# Patient Record
Sex: FEMALE | Race: WHITE | NOT HISPANIC OR LATINO | Employment: FULL TIME | URBAN - METROPOLITAN AREA
[De-identification: names, ages, dates, MRNs, and addresses within clinical notes are randomized per-mention and may not be internally consistent; named-entity substitution may affect disease eponyms.]

---

## 2017-05-25 ENCOUNTER — OFFICE VISIT (OUTPATIENT)
Dept: URGENT CARE | Facility: CLINIC | Age: 22
End: 2017-05-25
Payer: COMMERCIAL

## 2017-05-25 VITALS
WEIGHT: 159.19 LBS | TEMPERATURE: 98 F | BODY MASS INDEX: 24.13 KG/M2 | HEIGHT: 68 IN | HEART RATE: 104 BPM | SYSTOLIC BLOOD PRESSURE: 118 MMHG | OXYGEN SATURATION: 98 % | DIASTOLIC BLOOD PRESSURE: 70 MMHG

## 2017-05-25 DIAGNOSIS — H60.91 OTITIS EXTERNA OF RIGHT EAR, UNSPECIFIED CHRONICITY, UNSPECIFIED TYPE: ICD-10-CM

## 2017-05-25 DIAGNOSIS — H66.91 RIGHT OTITIS MEDIA, UNSPECIFIED CHRONICITY, UNSPECIFIED OTITIS MEDIA TYPE: Primary | ICD-10-CM

## 2017-05-25 PROCEDURE — 99213 OFFICE O/P EST LOW 20 MIN: CPT | Mod: S$GLB,,, | Performed by: NURSE PRACTITIONER

## 2017-05-25 PROCEDURE — 99999 PR PBB SHADOW E&M-EST. PATIENT-LVL III: CPT | Mod: PBBFAC,,,

## 2017-05-25 RX ORDER — NEOMYCIN SULFATE, POLYMYXIN B SULFATE AND HYDROCORTISONE 10; 3.5; 1 MG/ML; MG/ML; [USP'U]/ML
3 SUSPENSION/ DROPS AURICULAR (OTIC) 3 TIMES DAILY
Qty: 10 ML | Refills: 0 | Status: SHIPPED | OUTPATIENT
Start: 2017-05-25 | End: 2017-10-16

## 2017-05-25 RX ORDER — AMOXICILLIN 500 MG/1
500 CAPSULE ORAL 3 TIMES DAILY
Qty: 30 CAPSULE | Refills: 0 | Status: SHIPPED | OUTPATIENT
Start: 2017-05-25 | End: 2017-06-04

## 2017-05-26 NOTE — PROGRESS NOTES
Subjective:       Patient ID: Weston Acuña is a 21 y.o. female.    Chief Complaint: Otalgia ((R))    Srini is a 21 year old female to clinic today with complaints of right otalgia that began today.       Otalgia    There is pain in the right ear. This is a new problem. The current episode started today. The problem occurs constantly. The problem has been gradually worsening. There has been no fever. The pain is at a severity of 6/10. The pain is moderate. Pertinent negatives include no abdominal pain, coughing, diarrhea, ear discharge, headaches, hearing loss, neck pain, rash, rhinorrhea, sore throat or vomiting. She has tried nothing for the symptoms.     Review of Systems   Constitutional: Negative for chills, diaphoresis, fatigue and fever.   HENT: Positive for ear pain (right). Negative for congestion, ear discharge, hearing loss, postnasal drip, rhinorrhea, sinus pressure, sneezing, sore throat and trouble swallowing.    Eyes: Negative for pain.   Respiratory: Negative for cough, chest tightness, shortness of breath and wheezing.    Cardiovascular: Negative for chest pain and palpitations.   Gastrointestinal: Negative for abdominal pain, diarrhea, nausea and vomiting.   Genitourinary: Negative for dysuria.   Musculoskeletal: Negative for back pain, myalgias and neck pain.   Skin: Negative for rash.   Neurological: Negative for dizziness, light-headedness and headaches.       Objective:      Physical Exam   Constitutional: She is oriented to person, place, and time. She appears well-developed and well-nourished. No distress.   HENT:   Head: Normocephalic.   Right Ear: External ear and ear canal normal. There is tenderness. Tympanic membrane is injected and bulging. A middle ear effusion is present.   Left Ear: Tympanic membrane, external ear and ear canal normal. No tenderness. Tympanic membrane is not bulging.   Nose: Nose normal. No mucosal edema or rhinorrhea. Right sinus exhibits no maxillary sinus  tenderness and no frontal sinus tenderness. Left sinus exhibits no maxillary sinus tenderness and no frontal sinus tenderness.   Mouth/Throat: Uvula is midline, oropharynx is clear and moist and mucous membranes are normal. No oropharyngeal exudate, posterior oropharyngeal edema or posterior oropharyngeal erythema.   Eyes: Conjunctivae and EOM are normal. Pupils are equal, round, and reactive to light.   Neck: Normal range of motion. Neck supple.   Cardiovascular: Normal rate, regular rhythm and normal heart sounds.  Exam reveals no gallop and no friction rub.    No murmur heard.  Pulmonary/Chest: Effort normal and breath sounds normal. No accessory muscle usage. No apnea, no tachypnea and no bradypnea. No respiratory distress. She has no decreased breath sounds. She has no wheezes. She has no rhonchi. She has no rales.   Lymphadenopathy:        Head (right side): No submental, no submandibular and no tonsillar adenopathy present.        Head (left side): No submental, no submandibular and no tonsillar adenopathy present.     She has no cervical adenopathy.   Neurological: She is alert and oriented to person, place, and time.   Skin: Skin is warm and dry. No rash noted. She is not diaphoretic.   Psychiatric: She has a normal mood and affect. Her speech is normal and behavior is normal.   Nursing note and vitals reviewed.      Assessment:       1. Right otitis media, unspecified chronicity, unspecified otitis media type    2. Otitis externa of right ear, unspecified chronicity, unspecified type        Plan:   Right otitis media, unspecified chronicity, unspecified otitis media type  -     amoxicillin (AMOXIL) 500 MG capsule; Take 1 capsule (500 mg total) by mouth 3 (three) times daily.  Dispense: 30 capsule; Refill: 0    Otitis externa of right ear, unspecified chronicity, unspecified type  -     neomycin-polymyxin-hydrocortisone (CORTISPORIN) 3.5-10,000-1 mg/mL-unit/mL-% otic suspension; Place 3 drops into the right  ear 3 (three) times daily.  Dispense: 10 mL; Refill: 0        Antibiotic Therapy  Take antibiotics for entire course.  Do not save medications for later, all medications must be taken for full regimen.    Follow prescribed treatment plan as directed.  Stay hydrated and rest.  Report to ER if symptoms worsen.  Follow up with PCP in 2-3 days or sooner if symptoms do not improve.

## 2017-05-26 NOTE — PATIENT INSTRUCTIONS
Understanding Middle Ear Infections in Children  Middle ear infections are most common in children under age 5. Crankiness, a fever, and tugging at or rubbing the ear may all be signs that your child has a middle ear infection. This is especially true if your child has a cold or other viral illness. It's important to call your healthcare provider if you see these or any of the signs listed below.  Call your healthcare provider's office if you notice any signs of a middle ear infection.   What are middle ear infections?    Middle ear infections occur behind the eardrum. The eardrum is the thin sheet of tissue that passes sound waves between the outer and middle ear. These infections are usually caused by bacteria or viruses. These are often related to a recent cold or allergy problem.  A blocked tube  In young children, these bacteria or viruses likely reach the middle ear by traveling the short length of the eustachian tube from the back of the nose. Once in the middle ear, they multiply and spread. This irritates delicate tissues lining the middle ear and eustachian tube. If the tube lining swells enough to block off the tube, air pressure drops in the middle ear. This pulls the eardrum inward, making it stiffer and less able to transmit sound.  Fluid buildup causes pain  Once the eustachian tube swells shut, moisture cant drain from the middle ear. Fluid that should flush out the infection builds up in the chamber. This may raise pressure behind the eardrum. This can decrease pain slightly. But if the infection spreads to this fluid, pressure behind the eardrum goes way up. The eardrum is forced outward. It becomes painful, and may break.  Chronic fluid affects hearing  If the eardrum doesnt break and the tube remains blocked, the fluid becomes an ongoing condition (chronic). As the immediate (acute) infection passes, the middle ear fluid thickens. It becomes sticky and takes up less space. Pressure drops in  the middle ear once more. Inward suction stiffens the eardrum. This affects hearing. If the fluid is not removed, the eardrum may be stretched and damaged.  Signs of middle ear problems  · A temperature over 100.4°F (38.0°C) and cold symptoms  · Severe ear pain  · Any kind of discharge from the ear  · Ear pain that gets worse or doesnt go away after a few days   When to call your child's healthcare provider  Call your child's healthcare provider's office if your otherwise healthy child has any of the signs or symptoms described below:  · In an infant under 3 months old, a rectal temperature of 100.4°F (38.0°C) or higher  · In a child of any age who has a repeated temperature of 104°F (40°C) or higher  · A fever that lasts more than 24 hours in a child under 2 years old, or for 3 days in a child 2 years or older  · Your child has had a seizure caused by the fever  · Rapid breathing or shortness of breath  · A stiff neck or headache  · Difficulty swallowing  · Your child acts ill after the fever is gone  · Persistent brown, green, or bloody mucus  · Signs of dehydration. These include severe thirst, dark yellow urine, infrequent urination, dull or sunken eyes, dry skin, and dry or cracked lips.  · Your child still doesn't look or act right to you, even after taking a non-aspirin pain reliever  Date Last Reviewed: 11/1/2016  © 0835-9664 [employer]. [address] [place] [address], [place] [address]. All rights reserved. This information is not intended as a substitute for professional medical care. Always follow your healthcare professional's instructions.

## 2017-10-16 ENCOUNTER — OFFICE VISIT (OUTPATIENT)
Dept: INTERNAL MEDICINE | Facility: CLINIC | Age: 22
End: 2017-10-16
Payer: COMMERCIAL

## 2017-10-16 ENCOUNTER — LAB VISIT (OUTPATIENT)
Dept: LAB | Facility: HOSPITAL | Age: 22
End: 2017-10-16
Attending: FAMILY MEDICINE
Payer: COMMERCIAL

## 2017-10-16 VITALS
TEMPERATURE: 98 F | HEIGHT: 68 IN | SYSTOLIC BLOOD PRESSURE: 100 MMHG | HEART RATE: 82 BPM | BODY MASS INDEX: 24.96 KG/M2 | WEIGHT: 164.69 LBS | DIASTOLIC BLOOD PRESSURE: 78 MMHG

## 2017-10-16 DIAGNOSIS — Z00.00 ROUTINE GENERAL MEDICAL EXAMINATION AT A HEALTH CARE FACILITY: Primary | ICD-10-CM

## 2017-10-16 DIAGNOSIS — J00 ACUTE RHINITIS, UNSPECIFIED TYPE: ICD-10-CM

## 2017-10-16 DIAGNOSIS — Z00.00 ROUTINE GENERAL MEDICAL EXAMINATION AT A HEALTH CARE FACILITY: ICD-10-CM

## 2017-10-16 LAB
BASOPHILS # BLD AUTO: 0.01 K/UL
BASOPHILS NFR BLD: 0.1 %
DIFFERENTIAL METHOD: NORMAL
EOSINOPHIL # BLD AUTO: 0.1 K/UL
EOSINOPHIL NFR BLD: 1 %
ERYTHROCYTE [DISTWIDTH] IN BLOOD BY AUTOMATED COUNT: 12.3 %
HCT VFR BLD AUTO: 42.1 %
HGB BLD-MCNC: 13.6 G/DL
IMM GRANULOCYTES # BLD AUTO: 0.02 K/UL
IMM GRANULOCYTES NFR BLD AUTO: 0.2 %
LYMPHOCYTES # BLD AUTO: 2.4 K/UL
LYMPHOCYTES NFR BLD: 29.2 %
MCH RBC QN AUTO: 31 PG
MCHC RBC AUTO-ENTMCNC: 32.3 G/DL
MCV RBC AUTO: 96 FL
MONOCYTES # BLD AUTO: 0.6 K/UL
MONOCYTES NFR BLD: 7.2 %
NEUTROPHILS # BLD AUTO: 5.1 K/UL
NEUTROPHILS NFR BLD: 62.3 %
NRBC BLD-RTO: 0 /100 WBC
PLATELET # BLD AUTO: 239 K/UL
PMV BLD AUTO: 11.2 FL
RBC # BLD AUTO: 4.39 M/UL
WBC # BLD AUTO: 8.11 K/UL

## 2017-10-16 PROCEDURE — 80053 COMPREHEN METABOLIC PANEL: CPT

## 2017-10-16 PROCEDURE — 90471 IMMUNIZATION ADMIN: CPT | Mod: S$GLB,,, | Performed by: FAMILY MEDICINE

## 2017-10-16 PROCEDURE — 85025 COMPLETE CBC W/AUTO DIFF WBC: CPT

## 2017-10-16 PROCEDURE — 90686 IIV4 VACC NO PRSV 0.5 ML IM: CPT | Mod: S$GLB,,, | Performed by: FAMILY MEDICINE

## 2017-10-16 PROCEDURE — 80061 LIPID PANEL: CPT

## 2017-10-16 PROCEDURE — 84443 ASSAY THYROID STIM HORMONE: CPT

## 2017-10-16 PROCEDURE — 99395 PREV VISIT EST AGE 18-39: CPT | Mod: 25,S$GLB,, | Performed by: FAMILY MEDICINE

## 2017-10-16 PROCEDURE — 36415 COLL VENOUS BLD VENIPUNCTURE: CPT | Mod: PO

## 2017-10-16 PROCEDURE — 99999 PR PBB SHADOW E&M-EST. PATIENT-LVL III: CPT | Mod: PBBFAC,,, | Performed by: FAMILY MEDICINE

## 2017-10-16 NOTE — PROGRESS NOTES
Subjective:      Patient ID: Weston Acuña is a 22 y.o. female.    Chief Complaint: Establish Care and Sinus Problem    Pt is coming in today to get established with PCP. I see her parents. 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 Wants to do OT. Sees Dr Amarilis Hall in GYN. On ocps x 6 yrs. Prior peds was Dr stein. Mom has MS. Willing to get flu shot. Uri symptoms over the weekend but getting better. Mild sore throat and drip issues. Using cough syrup.         No results found for: WBC, HGB, HCT, PLT, CHOL, TRIG, HDL, LDLDIRECT, ALT, AST, NA, K, CL, CREATININE, BUN, CO2, TSH, PSA, INR, GLUF, HGBA1C, MICROALBUR    Review of Systems   Constitutional: Negative for chills, fatigue and fever.   HENT: Positive for postnasal drip and rhinorrhea. Negative for ear pain, sore throat, trouble swallowing and voice change.    Eyes: Negative for pain and visual disturbance.   Respiratory: Negative for cough and shortness of breath.    Cardiovascular: Negative for chest pain and leg swelling.   Gastrointestinal: Negative for abdominal pain, blood in stool, nausea and vomiting.   Endocrine: Negative for cold intolerance and heat intolerance.   Genitourinary: Negative for dysuria and frequency.   Musculoskeletal: Negative for joint swelling, myalgias and neck pain.   Skin: Negative for color change and rash.   Neurological: Negative for dizziness and headaches.   Psychiatric/Behavioral: Negative for behavioral problems, decreased concentration, dysphoric mood and sleep disturbance.     Objective:     Physical Exam   Constitutional: She is oriented to person, place, and time. She appears well-developed and well-nourished.   HENT:   Head: Normocephalic and atraumatic.   Right Ear: External ear normal.   Left Ear: External ear normal.   Nose: Rhinorrhea present. No mucosal edema. No epistaxis. Right sinus exhibits no maxillary sinus tenderness and no frontal sinus tenderness. Left sinus exhibits no maxillary sinus tenderness and no  frontal sinus tenderness.   Mouth/Throat: Oropharynx is clear and moist. No oropharyngeal exudate.   Eyes: EOM are normal.   Neck: Normal range of motion. Neck supple. No thyromegaly present.   Cardiovascular: Normal rate and regular rhythm.  Exam reveals no gallop and no friction rub.    No murmur heard.  Pulmonary/Chest: Effort normal. No respiratory distress. She has no wheezes. She has no rales.   Abdominal: Soft. Bowel sounds are normal. She exhibits no distension. There is no tenderness. There is no rebound.   Musculoskeletal: Normal range of motion. She exhibits no edema.   Lymphadenopathy:     She has no cervical adenopathy.   Neurological: She is alert and oriented to person, place, and time.   Skin: Skin is warm and dry. No rash noted.   Psychiatric: She has a normal mood and affect. Her speech is normal and behavior is normal. Judgment and thought content normal.   Vitals reviewed.    Assessment:     1. Routine general medical examination at a health care facility    2. Acute rhinitis, unspecified type      Plan:   Weston was seen today for establish care and sinus problem.    Diagnoses and all orders for this visit:    Routine general medical examination at a health care facility- - labs ordered. Discussed Health Maintenance issues. Seeing GYN. Willing to get flu shot today.     -     TSH; Future  -     Lipid panel; Future  -     Comprehensive metabolic panel; Future  -     CBC auto differential; Future    Acute rhinitis, unspecified type- new. Add claritin daily. Cont with cough meds. followup if not improving in 4-5 days through portal.     Other orders  -     Influenza - Quadrivalent (3 years & older) (PF)            Return in about 1 year (around 10/16/2018) for physical with Dr ANTHONY.

## 2017-10-17 LAB
ALBUMIN SERPL BCP-MCNC: 3.8 G/DL
ALP SERPL-CCNC: 83 U/L
ALT SERPL W/O P-5'-P-CCNC: 30 U/L
ANION GAP SERPL CALC-SCNC: 15 MMOL/L
AST SERPL-CCNC: 22 U/L
BILIRUB SERPL-MCNC: 0.4 MG/DL
BUN SERPL-MCNC: 18 MG/DL
CALCIUM SERPL-MCNC: 10.1 MG/DL
CHLORIDE SERPL-SCNC: 104 MMOL/L
CHOLEST SERPL-MCNC: 213 MG/DL
CHOLEST/HDLC SERPL: 3.1 {RATIO}
CO2 SERPL-SCNC: 21 MMOL/L
CREAT SERPL-MCNC: 1.2 MG/DL
EST. GFR  (AFRICAN AMERICAN): >60 ML/MIN/1.73 M^2
EST. GFR  (NON AFRICAN AMERICAN): >60 ML/MIN/1.73 M^2
GLUCOSE SERPL-MCNC: 74 MG/DL
HDLC SERPL-MCNC: 68 MG/DL
HDLC SERPL: 31.9 %
LDLC SERPL CALC-MCNC: 120 MG/DL
NONHDLC SERPL-MCNC: 145 MG/DL
POTASSIUM SERPL-SCNC: 4.6 MMOL/L
PROT SERPL-MCNC: 7.4 G/DL
SODIUM SERPL-SCNC: 140 MMOL/L
TRIGL SERPL-MCNC: 125 MG/DL
TSH SERPL DL<=0.005 MIU/L-ACNC: 2.95 UIU/ML

## 2017-10-20 ENCOUNTER — PATIENT MESSAGE (OUTPATIENT)
Dept: INTERNAL MEDICINE | Facility: CLINIC | Age: 22
End: 2017-10-20

## 2017-12-23 ENCOUNTER — OFFICE VISIT (OUTPATIENT)
Dept: URGENT CARE | Facility: CLINIC | Age: 22
End: 2017-12-23
Payer: COMMERCIAL

## 2017-12-23 VITALS
TEMPERATURE: 100 F | HEIGHT: 68 IN | BODY MASS INDEX: 24.79 KG/M2 | SYSTOLIC BLOOD PRESSURE: 122 MMHG | RESPIRATION RATE: 18 BRPM | DIASTOLIC BLOOD PRESSURE: 72 MMHG | OXYGEN SATURATION: 98 % | HEART RATE: 114 BPM | WEIGHT: 163.56 LBS

## 2017-12-23 DIAGNOSIS — J06.9 VIRAL UPPER RESPIRATORY TRACT INFECTION: ICD-10-CM

## 2017-12-23 DIAGNOSIS — J01.00 ACUTE NON-RECURRENT MAXILLARY SINUSITIS: Primary | ICD-10-CM

## 2017-12-23 PROCEDURE — 99214 OFFICE O/P EST MOD 30 MIN: CPT | Mod: 25,S$GLB,, | Performed by: FAMILY MEDICINE

## 2017-12-23 PROCEDURE — 96372 THER/PROPH/DIAG INJ SC/IM: CPT | Mod: S$GLB,,, | Performed by: FAMILY MEDICINE

## 2017-12-23 PROCEDURE — 99999 PR PBB SHADOW E&M-EST. PATIENT-LVL III: CPT | Mod: PBBFAC,,,

## 2017-12-23 RX ORDER — BETAMETHASONE SODIUM PHOSPHATE AND BETAMETHASONE ACETATE 3; 3 MG/ML; MG/ML
6 INJECTION, SUSPENSION INTRA-ARTICULAR; INTRALESIONAL; INTRAMUSCULAR; SOFT TISSUE
Status: COMPLETED | OUTPATIENT
Start: 2017-12-23 | End: 2017-12-23

## 2017-12-23 RX ADMIN — BETAMETHASONE SODIUM PHOSPHATE AND BETAMETHASONE ACETATE 6 MG: 3; 3 INJECTION, SUSPENSION INTRA-ARTICULAR; INTRALESIONAL; INTRAMUSCULAR; SOFT TISSUE at 01:12

## 2017-12-23 NOTE — PATIENT INSTRUCTIONS
Sinusitis, likely viral in origin. RTC if not better in 5 -7 days to be re-assessed for abx.  Take OTC medications for cough and nasal congestion  Rest and Increase Fluids  Tylenol or Motrin as needed for fever, aches, or pain    When to Use Antibiotics   Antibiotics are medicines used to treat infections caused by bacteria. They dont work for illnesses caused by viruses or an allergic reaction. In fact, taking antibiotics for reasons other than a bacterial infection can cause problems. For example, you may have side effects from the medicine. And if you really need an antibiotic, it may not work well.                                                                                                                                              When antibiotics wont help  Your healthcare provider wont usually prescribe antibiotics for the following conditions. You can help by not asking for them if you have:   · A cold. This type of illness is caused by a virus. It can cause a runny nose, stuffed-up nose, sneezing, coughing, headache, mild body aches, and low fever. A cold gets better on its own in a few days to a week.  · The flu (influenza). This is a respiratory illness caused by a virus. The flu usually goes away on its own in a week or so. It can cause fever, body aches, sore throat, and fatigue.  · Bronchitis. This is an infection in the lungs most often caused by a virus. You may have coughing, phlegm, body aches, and a low fever. A common type of bronchitis is known as a chest cold (acute bronchitis). This often happens after you have a respiratory infection like a common cold. Bronchitis can take weeks to go away, but antibiotics usually dont help.  · Most sore throats. Sore throats are most often caused by viruses. Your throat may feel scratchy or achy, and it may hurt to swallow. You may also have a low fever and body aches. A sore throat usually gets better in a few days.  · Most ear infections. An ear  infection may be caused by a virus or bacteria. It causes pain in the ear. Antibiotics usually dont help, and the infection goes away on its own.  · Most sinus infections (sinusitis). This kind of infection causes sinus pain and swelling, and a runny nose. In most cases, sinusitis goes away on its own, and antibiotics dont make recovery quicker.  · Allergic rhinitis. This is a set of symptoms caused by an allergic reaction. You may have sneezing, a runny nose, itchy or watery eyes, or a sore throat. Allergies are not treated with antibiotics.  · Low fever. A mild fever thats less than 100.4°F (38°C) most likely doesnt need treatment with antibiotics.   When antibiotics can help   Antibiotics can be used to treat:                                                     · Strep throat. This is a throat infectioncaused by a certain type of bacteria. Symptoms of strep throat include a sore throat, white patches on the tonsils, red spots on the roof of the mouth, fever, body aches, and nausea and vomiting.  · Urinary tract infection (UTI). This is a bacterial infection of the bladder and the tube that takes urine out of the body. It can cause burning pain and urine thats cloudy or tinted with blood. UTIs are very common. Antibiotics usually help treat these infections.  · Some ear infections. In some cases, a healthcare provider may prescribe antibiotics for an ear infection. You may need a test to show whats causing the ear infection.  · Some sinus infections. In some cases, yourhealthcare provider may give you antibiotics. He or she may first need to make sure your symptoms arent caused by a virus, fungus, allergies, or air pollutants such as smoke.   Your doctor may also recommend antibiotics if you have a condition that can affect your immune system, such as diabetes or cancer.   Self-care at home   If your infection cant be treated with antibiotics, you can take other steps to feel better. Try the remedies  below. In general:   · Rest and sleep as much as needed.  · Drink water and other clear fluids.  · Dont smoke, and avoid smoke from other people.  · Use over-the-counter medicine such as acetaminophen to ease pain or fever, as directed by your healthcare provider.   To treat sinus pain or nasal congestion:   · Put a warm, moist washcloth on your face where you feel sinus pain or pressure.  · Use a nasal spray with medicine or saline, as directed by your healthcare provider.  · Breathe in steam from a hot shower.  · Use a humidifier or cool mist vaporizer.   To quiet a cough:   · Use a humidifier or cool mist vaporizer.  · Breathe in steam from a hot shower.  · Use cough lozenges.   To sooth a sore throat:   · Suck on ice chips, popsicles, or lozenges.  · Use a sore throat spray.  · Use a humidifier or cool mist vaporizer.  · Gargle with saltwater.  · Drink warm liquids.   To ease ear pain:   · Hold a warm, moist washcloth on the ear for 10 minutes at a time.  Date Last Reviewed: 9/1/2016  © 6739-5642 [employer]. [address] [place] [address]. All rights reserved. This information is not intended as a substitute for professional medical care. Always follow your healthcare professional's instructions.        Acute Sinusitis    Acute sinusitis is irritation and swelling of the sinuses. It is usually caused by a viral infection after a common cold. Your doctor can help you find relief.  What is acute sinusitis?  Sinuses are air-filled spaces in the skull behind the face. They are kept moist and clean by a lining of mucosa. Things such as pollen, smoke, and chemical fumes can irritate the mucosa. It can then swell up. As a response to irritation, the mucosa makes more mucus and other fluids. Tiny hairlike cilia cover the mucosa. Cilia help carry mucus toward the opening of the sinus. Too much mucus may cause the cilia to stop working. This blocks the sinus opening. A buildup of fluid in the  sinuses then causes pain and pressure. It can also encourage bacteria to grow in the sinuses.  Common symptoms of acute sinusitis  You may have:  · Facial soreness pain  · Headache  · Fever  · Fluid draining in the back of the throat (postnasal drip)  · Congestion  · Drainage that is thick and colored, instead of clear  · Cough  Diagnosing acute sinusitis  Your doctor will ask about your symptoms and health history. He or she will look at your ear, nose, and throat. You usually won't need to have X-rays taken.    The doctor may take a sample of mucus to check for bacteria. If you have sinusitis that keeps coming back, you may need imaging tests such as X-rays or CAT scans. This will help your doctor check for a structural problem that may be causing the infection.  Treating acute sinusitis  Treatment is aimed at unblocking the sinus opening and helping the cilia work again. You may need to take antihistamine and decongestant medicine. These can reduce inflammation and decrease the amount of fluid your sinuses make. If you have a bacterial infection, you will need to take antibiotic medicine for 10 to 14 days. Take this medicine until it is gone, even if you feel better.  Date Last Reviewed: 10/1/2016  © 0558-4398 [employer]. [address] [place] [address], [place] [address]. All rights reserved. This information is not intended as a substitute for professional medical care. Always follow your healthcare professional's instructions.

## 2017-12-23 NOTE — ASSESSMENT & PLAN NOTE
Sinusitis, likely viral in origin. RTC if not better in 5 -7 days to be re-assessed for abx.  Take OTC medications for cough and nasal congestion  Rest and Increase Fluids  Tylenol or Motrin as needed for fever, aches, or pain

## 2017-12-23 NOTE — PROGRESS NOTES
Subjective:       Patient ID: Weston Acuña is a 22 y.o. female.    Chief Complaint: Nasal Congestion    Sick contacts at . No known flu contacts known        URI    This is a new problem. The current episode started in the past 7 days. The problem has been gradually worsening. There has been no fever. Associated symptoms include congestion, coughing, headaches, rhinorrhea, sinus pain, a sore throat and wheezing. Pertinent negatives include no abdominal pain, chest pain, nausea or rash. Treatments tried: nyquil, dayquil, theraflu. The treatment provided no relief.     Review of Systems   HENT: Positive for congestion, rhinorrhea, sinus pain and sore throat.    Respiratory: Positive for cough and wheezing.    Cardiovascular: Negative for chest pain.   Gastrointestinal: Negative for abdominal pain and nausea.   Skin: Negative for rash.   Neurological: Positive for headaches.       Objective:      Physical Exam   Constitutional: She appears well-developed and well-nourished. No distress.   HENT:   Head: Normocephalic and atraumatic.   Right Ear: Hearing normal.   Left Ear: Hearing normal.   Nose: Mucosal edema and rhinorrhea present. Right sinus exhibits maxillary sinus tenderness. Right sinus exhibits no frontal sinus tenderness. Left sinus exhibits maxillary sinus tenderness. Left sinus exhibits no frontal sinus tenderness.   Mouth/Throat: Posterior oropharyngeal erythema present. No tonsillar exudate.   Eyes: Conjunctivae are normal.   Cardiovascular: Normal rate and regular rhythm.    Pulmonary/Chest: Effort normal and breath sounds normal. No respiratory distress. She has no wheezes.   Neurological: She is alert.   Skin: Skin is warm and dry. No rash noted. She is not diaphoretic. No erythema.   Nursing note and vitals reviewed.      Assessment:       1. Acute non-recurrent maxillary sinusitis    2. Viral upper respiratory tract infection        Plan:           Acute non-recurrent maxillary  sinusitis  Sinusitis, likely viral in origin. RTC if not better in 5 -7 days to be re-assessed for abx.  Take OTC medications for cough and nasal congestion  Rest and Increase Fluids  Tylenol or Motrin as needed for fever, aches, or pain        Acute non-recurrent maxillary sinusitis  -     betamethasone acetate-betamethasone sodium phosphate injection 6 mg; Inject 1 mL (6 mg total) into the muscle one time.    Viral upper respiratory tract infection  -     betamethasone acetate-betamethasone sodium phosphate injection 6 mg; Inject 1 mL (6 mg total) into the muscle one time.

## 2018-03-26 PROBLEM — J01.00 ACUTE NON-RECURRENT MAXILLARY SINUSITIS: Status: RESOLVED | Noted: 2017-12-23 | Resolved: 2018-03-26

## 2018-11-07 ENCOUNTER — TELEPHONE (OUTPATIENT)
Dept: INTERNAL MEDICINE | Facility: CLINIC | Age: 23
End: 2018-11-07

## 2018-11-07 NOTE — TELEPHONE ENCOUNTER
I called and spoke with patients mom who had questions concerning the process of getting tb test and she verbalized that she will have patient call and schedule jose.aa

## 2018-11-12 ENCOUNTER — PATIENT MESSAGE (OUTPATIENT)
Dept: INTERNAL MEDICINE | Facility: CLINIC | Age: 23
End: 2018-11-12

## 2018-11-13 ENCOUNTER — PATIENT MESSAGE (OUTPATIENT)
Dept: INTERNAL MEDICINE | Facility: CLINIC | Age: 23
End: 2018-11-13

## 2018-11-15 ENCOUNTER — OFFICE VISIT (OUTPATIENT)
Dept: INTERNAL MEDICINE | Facility: CLINIC | Age: 23
End: 2018-11-15
Payer: COMMERCIAL

## 2018-11-15 ENCOUNTER — LAB VISIT (OUTPATIENT)
Dept: LAB | Facility: HOSPITAL | Age: 23
End: 2018-11-15
Attending: FAMILY MEDICINE
Payer: COMMERCIAL

## 2018-11-15 VITALS
TEMPERATURE: 97 F | WEIGHT: 165.56 LBS | DIASTOLIC BLOOD PRESSURE: 64 MMHG | HEIGHT: 68 IN | SYSTOLIC BLOOD PRESSURE: 112 MMHG | BODY MASS INDEX: 25.09 KG/M2

## 2018-11-15 DIAGNOSIS — Z86.19 HISTORY OF CHICKENPOX: ICD-10-CM

## 2018-11-15 DIAGNOSIS — Z23 NEED FOR TDAP VACCINATION: ICD-10-CM

## 2018-11-15 DIAGNOSIS — Z00.00 ROUTINE GENERAL MEDICAL EXAMINATION AT A HEALTH CARE FACILITY: Primary | ICD-10-CM

## 2018-11-15 DIAGNOSIS — R53.83 FATIGUE, UNSPECIFIED TYPE: ICD-10-CM

## 2018-11-15 DIAGNOSIS — Z23 NEED FOR MENACTRA VACCINATION: ICD-10-CM

## 2018-11-15 DIAGNOSIS — L70.9 ACNE, UNSPECIFIED ACNE TYPE: ICD-10-CM

## 2018-11-15 DIAGNOSIS — Z00.00 ROUTINE GENERAL MEDICAL EXAMINATION AT A HEALTH CARE FACILITY: ICD-10-CM

## 2018-11-15 LAB
25(OH)D3+25(OH)D2 SERPL-MCNC: 44 NG/ML
ALBUMIN SERPL BCP-MCNC: 3.9 G/DL
ALP SERPL-CCNC: 52 U/L
ALT SERPL W/O P-5'-P-CCNC: 16 U/L
ANION GAP SERPL CALC-SCNC: 7 MMOL/L
AST SERPL-CCNC: 17 U/L
BASOPHILS # BLD AUTO: 0.02 K/UL
BASOPHILS NFR BLD: 0.4 %
BILIRUB SERPL-MCNC: 0.5 MG/DL
BUN SERPL-MCNC: 16 MG/DL
CALCIUM SERPL-MCNC: 9.9 MG/DL
CHLORIDE SERPL-SCNC: 104 MMOL/L
CHOLEST SERPL-MCNC: 198 MG/DL
CHOLEST/HDLC SERPL: 2.6 {RATIO}
CO2 SERPL-SCNC: 27 MMOL/L
CREAT SERPL-MCNC: 1 MG/DL
DIFFERENTIAL METHOD: NORMAL
EOSINOPHIL # BLD AUTO: 0.1 K/UL
EOSINOPHIL NFR BLD: 1.5 %
ERYTHROCYTE [DISTWIDTH] IN BLOOD BY AUTOMATED COUNT: 12.1 %
EST. GFR  (AFRICAN AMERICAN): >60 ML/MIN/1.73 M^2
EST. GFR  (NON AFRICAN AMERICAN): >60 ML/MIN/1.73 M^2
GLUCOSE SERPL-MCNC: 84 MG/DL
HCT VFR BLD AUTO: 40.5 %
HDLC SERPL-MCNC: 76 MG/DL
HDLC SERPL: 38.4 %
HGB BLD-MCNC: 13.1 G/DL
IMM GRANULOCYTES # BLD AUTO: 0.02 K/UL
IMM GRANULOCYTES NFR BLD AUTO: 0.4 %
IRON SERPL-MCNC: 138 UG/DL
LDLC SERPL CALC-MCNC: 101.2 MG/DL
LYMPHOCYTES # BLD AUTO: 2.2 K/UL
LYMPHOCYTES NFR BLD: 40.3 %
MCH RBC QN AUTO: 30.4 PG
MCHC RBC AUTO-ENTMCNC: 32.3 G/DL
MCV RBC AUTO: 94 FL
MONOCYTES # BLD AUTO: 0.4 K/UL
MONOCYTES NFR BLD: 6.4 %
NEUTROPHILS # BLD AUTO: 2.8 K/UL
NEUTROPHILS NFR BLD: 51 %
NONHDLC SERPL-MCNC: 122 MG/DL
NRBC BLD-RTO: 0 /100 WBC
PLATELET # BLD AUTO: 237 K/UL
PMV BLD AUTO: 11 FL
POTASSIUM SERPL-SCNC: 4 MMOL/L
PROT SERPL-MCNC: 7.1 G/DL
RBC # BLD AUTO: 4.31 M/UL
SATURATED IRON: 26 %
SODIUM SERPL-SCNC: 138 MMOL/L
T4 FREE SERPL-MCNC: 0.99 NG/DL
TOTAL IRON BINDING CAPACITY: 521 UG/DL
TRANSFERRIN SERPL-MCNC: 352 MG/DL
TRIGL SERPL-MCNC: 104 MG/DL
TSH SERPL DL<=0.005 MIU/L-ACNC: 2.32 UIU/ML
VIT B12 SERPL-MCNC: 246 PG/ML
WBC # BLD AUTO: 5.44 K/UL

## 2018-11-15 PROCEDURE — 85025 COMPLETE CBC W/AUTO DIFF WBC: CPT

## 2018-11-15 PROCEDURE — 86762 RUBELLA ANTIBODY: CPT

## 2018-11-15 PROCEDURE — 90471 IMMUNIZATION ADMIN: CPT | Mod: S$GLB,,, | Performed by: FAMILY MEDICINE

## 2018-11-15 PROCEDURE — 86706 HEP B SURFACE ANTIBODY: CPT

## 2018-11-15 PROCEDURE — 99395 PREV VISIT EST AGE 18-39: CPT | Mod: 25,S$GLB,, | Performed by: FAMILY MEDICINE

## 2018-11-15 PROCEDURE — 86787 VARICELLA-ZOSTER ANTIBODY: CPT

## 2018-11-15 PROCEDURE — 84439 ASSAY OF FREE THYROXINE: CPT

## 2018-11-15 PROCEDURE — 36415 COLL VENOUS BLD VENIPUNCTURE: CPT | Mod: PO

## 2018-11-15 PROCEDURE — 90472 IMMUNIZATION ADMIN EACH ADD: CPT | Mod: S$GLB,,, | Performed by: FAMILY MEDICINE

## 2018-11-15 PROCEDURE — 99999 PR PBB SHADOW E&M-EST. PATIENT-LVL III: CPT | Mod: PBBFAC,,, | Performed by: FAMILY MEDICINE

## 2018-11-15 PROCEDURE — 83540 ASSAY OF IRON: CPT

## 2018-11-15 PROCEDURE — 82306 VITAMIN D 25 HYDROXY: CPT

## 2018-11-15 PROCEDURE — 84443 ASSAY THYROID STIM HORMONE: CPT

## 2018-11-15 PROCEDURE — 86735 MUMPS ANTIBODY: CPT

## 2018-11-15 PROCEDURE — 90686 IIV4 VACC NO PRSV 0.5 ML IM: CPT | Mod: S$GLB,,, | Performed by: FAMILY MEDICINE

## 2018-11-15 PROCEDURE — 80053 COMPREHEN METABOLIC PANEL: CPT

## 2018-11-15 PROCEDURE — 82607 VITAMIN B-12: CPT

## 2018-11-15 PROCEDURE — 86765 RUBEOLA ANTIBODY: CPT

## 2018-11-15 PROCEDURE — 90715 TDAP VACCINE 7 YRS/> IM: CPT | Mod: S$GLB,,, | Performed by: FAMILY MEDICINE

## 2018-11-15 PROCEDURE — 90734 MENACWYD/MENACWYCRM VACC IM: CPT | Mod: S$GLB,,, | Performed by: FAMILY MEDICINE

## 2018-11-15 PROCEDURE — 80061 LIPID PANEL: CPT

## 2018-11-15 RX ORDER — CLINDAMYCIN PHOSPHATE 10 MG/G
GEL TOPICAL 2 TIMES DAILY
Qty: 30 G | Refills: 11 | Status: SHIPPED | OUTPATIENT
Start: 2018-11-15 | End: 2020-04-27

## 2018-11-15 NOTE — PROGRESS NOTES
Subjective:      Patient ID: Weston Acuña is a 23 y.o. female.    Chief Complaint: Annual Exam    Disclaimer:  This note is prepared using voice recognition software and as such is likely to have errors and has not been proof read. Please contact me for questions.     Pt is coming in today to get established with PCP. I see her parents. 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 Has interview lined for [employer]. Sleeping when she can, but sometimes from 10-10 other times gets up early 5- 10 hrs per night.  Sees Dr Amarilis Hall in GYN. On ocps x 7 yrs. Face having some breakouts and worse in last 2 years. Has appt with her in April for gyn.  Mom has MS. Willing to get flu shot, needing Tdap, needing menactra. Having some fatigue issues. Interested in blood work.  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  Needing titers.        Lab Results   Component Value Date    WBC 8.11 10/16/2017    HGB 13.6 10/16/2017    HCT 42.1 10/16/2017     10/16/2017    CHOL 213 (H) 10/16/2017    TRIG 125 10/16/2017    HDL 68 10/16/2017    ALT 30 10/16/2017    AST 22 10/16/2017     10/16/2017    K 4.6 10/16/2017     10/16/2017    CREATININE 1.2 10/16/2017    BUN 18 10/16/2017    CO2 21 (L) 10/16/2017    TSH 2.953 10/16/2017       No image results found.        Review of Systems   Constitutional: Positive for fatigue. Negative for activity change, appetite change and unexpected weight change.   HENT: Negative for hearing loss, rhinorrhea and trouble swallowing.    Eyes: Negative for discharge and visual disturbance.   Respiratory: Negative for chest tightness and wheezing.    Cardiovascular: Negative for chest pain and palpitations.   Gastrointestinal: Negative for blood in stool, constipation, diarrhea and vomiting.   Endocrine: Negative for polydipsia and polyuria.   Genitourinary: Negative for difficulty urinating, dysuria, hematuria and menstrual problem.   Musculoskeletal: Negative for  "arthralgias, joint swelling and neck pain.   Neurological: Negative for weakness and headaches.   Psychiatric/Behavioral: Negative for confusion and dysphoric mood.     Objective:     Vitals:    11/15/18 0800   BP: 112/64   Temp: 97 °F (36.1 °C)   TempSrc: Tympanic   Weight: 75.1 kg (165 lb 9.1 oz)   Height: 5' 8" (1.727 m)     Physical Exam   Constitutional: She is oriented to person, place, and time. She appears well-developed and well-nourished.   HENT:   Head: Normocephalic and atraumatic.   Right Ear: External ear normal.   Left Ear: External ear normal.   Mouth/Throat: Oropharynx is clear and moist.   Eyes: EOM are normal.   Neck: Normal range of motion. Neck supple. No thyromegaly present.   Cardiovascular: Normal rate and regular rhythm. Exam reveals no gallop and no friction rub.   No murmur heard.  Pulmonary/Chest: Effort normal. No respiratory distress. She has no wheezes. She has no rales.   Abdominal: Soft. Bowel sounds are normal. She exhibits no distension. There is no tenderness. There is no rebound.   Musculoskeletal: Normal range of motion. She exhibits no edema.   Lymphadenopathy:     She has no cervical adenopathy.   Neurological: She is alert and oriented to person, place, and time.   Skin: Skin is warm and dry. No rash noted.   Psychiatric: She has a normal mood and affect. Her behavior is normal. Judgment and thought content normal.   Vitals reviewed.    Assessment:     1. Routine general medical examination at a health care facility    2. Acne, unspecified acne type    3. Need for Tdap vaccination    4. Need for Menactra vaccination    5. Fatigue, unspecified type    6. History of chickenpox      Plan:   Weston was seen today for annual exam.    Diagnoses and all orders for this visit:    Routine general medical examination at a health care facility-labs ordered today ordered titer and update tetanus shot 2nd Menactra and flu shot today.  -     TSH; Future  -     T4, free; Future  -     Lipid " panel; Future  -     Comprehensive metabolic panel; Future  -     CBC auto differential; Future  -     Iron and TIBC; Future  -     Vitamin D; Future  -     Vitamin B12; Future  -     Rubella antibody, IgG; Future  -     Rubeola antibody IgG; Future  -     Mumps, IgG Screen; Future  -     Hepatitis B Surface Antibody, Qual/Quant; Future  -     Varicella zoster antibody, IgG; Future    Acne, unspecified acne type-has failed adapalene Epiduo.  Too drying.  Will send in a prescription for clindamycin.  -     TSH; Future  -     T4, free; Future  -     Lipid panel; Future  -     Comprehensive metabolic panel; Future  -     CBC auto differential; Future  -     Iron and TIBC; Future  -     Vitamin D; Future  -     Vitamin B12; Future    Need for Tdap vaccination-update today  -     TSH; Future  -     T4, free; Future  -     Lipid panel; Future  -     Comprehensive metabolic panel; Future  -     CBC auto differential; Future  -     Iron and TIBC; Future  -     Vitamin D; Future  -     Vitamin B12; Future    Need for Menactra vaccination-update today for 2nd dose  -     TSH; Future  -     T4, free; Future  -     Lipid panel; Future  -     Comprehensive metabolic panel; Future  -     CBC auto differential; Future  -     Iron and TIBC; Future  -     Vitamin D; Future  -     Vitamin B12; Future    Fatigue, unspecified type-patient desires workup checking thyroid CBC iron vitamin-D and B12 levels  -     TSH; Future  -     T4, free; Future  -     Lipid panel; Future  -     Comprehensive metabolic panel; Future  -     CBC auto differential; Future  -     Iron and TIBC; Future  -     Vitamin D; Future  -     Vitamin B12; Future    History of chickenpox-patient reported obtain titers today  -     Rubella antibody, IgG; Future  -     Rubeola antibody IgG; Future  -     Mumps, IgG Screen; Future  -     Hepatitis B Surface Antibody, Qual/Quant; Future  -     Varicella zoster antibody, IgG; Future    Other orders  -     clindamycin  phosphate 1% (CLINDAGEL) 1 % gel; Apply topically 2 (two) times daily.  -     (In Office Administered) Tdap Vaccine  -     (In Office Administered) Meningococcal Conjugate - MCV4P (MENACTRA)  -     Influenza - Quadrivalent (3 years & older) (PF)            Follow-up in about 1 year (around 11/15/2019) for physical with Dr ANTHONY.    There are no Patient Instructions on file for this visit.

## 2018-11-16 LAB
RUBV IGG SER-ACNC: 37.1 IU/ML
RUBV IGG SER-IMP: REACTIVE

## 2018-11-17 LAB
MUMPS IGG INTERPRETATION: POSITIVE
MUMPS IGG SCREEN: 2.39 ISR
RUBEOLA IGG ANTIBODY: 1.63 ISR
RUBEOLA INTERPRETATION: POSITIVE

## 2018-11-19 LAB
HBV SURFACE AB SER QL IA: POSITIVE
HBV SURFACE AB SERPL IA-ACNC: 60 MIU/ML

## 2018-11-21 ENCOUNTER — OFFICE VISIT (OUTPATIENT)
Dept: URGENT CARE | Facility: CLINIC | Age: 23
End: 2018-11-21
Payer: COMMERCIAL

## 2018-11-21 VITALS
HEIGHT: 68 IN | TEMPERATURE: 96 F | SYSTOLIC BLOOD PRESSURE: 118 MMHG | BODY MASS INDEX: 25.29 KG/M2 | RESPIRATION RATE: 18 BRPM | HEART RATE: 88 BPM | OXYGEN SATURATION: 100 % | DIASTOLIC BLOOD PRESSURE: 76 MMHG | WEIGHT: 166.88 LBS

## 2018-11-21 DIAGNOSIS — J06.9 VIRAL UPPER RESPIRATORY TRACT INFECTION: Primary | ICD-10-CM

## 2018-11-21 LAB
VARICELLA INTERPRETATION: ABNORMAL
VARICELLA ZOSTER IGG: 0.94 ISR

## 2018-11-21 PROCEDURE — 99214 OFFICE O/P EST MOD 30 MIN: CPT | Mod: 25,S$GLB,, | Performed by: FAMILY MEDICINE

## 2018-11-21 PROCEDURE — 96372 THER/PROPH/DIAG INJ SC/IM: CPT | Mod: S$GLB,,, | Performed by: FAMILY MEDICINE

## 2018-11-21 PROCEDURE — 99999 PR PBB SHADOW E&M-EST. PATIENT-LVL IV: CPT | Mod: PBBFAC,,, | Performed by: FAMILY MEDICINE

## 2018-11-21 RX ORDER — BETAMETHASONE SODIUM PHOSPHATE AND BETAMETHASONE ACETATE 3; 3 MG/ML; MG/ML
6 INJECTION, SUSPENSION INTRA-ARTICULAR; INTRALESIONAL; INTRAMUSCULAR; SOFT TISSUE
Status: COMPLETED | OUTPATIENT
Start: 2018-11-21 | End: 2018-11-21

## 2018-11-21 RX ADMIN — BETAMETHASONE SODIUM PHOSPHATE AND BETAMETHASONE ACETATE 6 MG: 3; 3 INJECTION, SUSPENSION INTRA-ARTICULAR; INTRALESIONAL; INTRAMUSCULAR; SOFT TISSUE at 03:11

## 2018-11-21 NOTE — PROGRESS NOTES
"Subjective:       Patient ID: Weston Acuña is a 23 y.o. female.    Chief Complaint: Chest Congestion and URI (possible)    /76 (BP Location: Right arm, Patient Position: Sitting)   Pulse 88   Temp 96.2 °F (35.7 °C) (Tympanic)   Resp 18   Ht 5' 8" (1.727 m)   Wt 75.7 kg (166 lb 14.2 oz)   LMP 10/29/2018   SpO2 100%   BMI 25.38 kg/m²     HPI  Head congestion sneezing postnasal drip for nearly a week. Taking hot honey lemon tea and using saline nasal drop. Seeking some relief for head congestion    Review of Systems   Constitutional: Negative.  Negative for chills and fever.   HENT: Positive for congestion, postnasal drip and sneezing. Negative for ear pain, facial swelling, rhinorrhea, sinus pressure, sinus pain and sore throat.    Eyes: Negative.    Respiratory: Negative.  Negative for cough.    Cardiovascular: Negative.    Neurological: Negative.        Objective:      Physical Exam   Constitutional: She is oriented to person, place, and time. She appears well-developed and well-nourished. No distress.   HENT:   Head: Normocephalic and atraumatic.   Mouth/Throat: Oropharynx is clear and moist. No oropharyngeal exudate.   TM clear effusion bilateral   Eyes: Conjunctivae and EOM are normal. Pupils are equal, round, and reactive to light. Right eye exhibits no discharge. Left eye exhibits no discharge.   Neck: Normal range of motion. Neck supple.   Cardiovascular: Normal rate and regular rhythm.   Pulmonary/Chest: Effort normal. No respiratory distress. She has no wheezes. She has no rales.   Musculoskeletal: Normal range of motion.   Lymphadenopathy:     She has no cervical adenopathy.   Neurological: She is alert and oriented to person, place, and time.   Skin: Skin is warm and dry. No rash noted. She is not diaphoretic.   Psychiatric: She has a normal mood and affect.   Nursing note and vitals reviewed.      Assessment:       1. Viral upper respiratory tract infection        Plan:     Weston was " seen today for chest congestion and uri.    Diagnoses and all orders for this visit:    Viral upper respiratory tract infection    Other orders  -     betamethasone acetate-betamethasone sodium phosphate injection 6 mg

## 2018-11-21 NOTE — PATIENT INSTRUCTIONS
Viral Upper Respiratory Illness (Adult)  You have a viral upper respiratory illness (URI), which is another term for the common cold. This illness is contagious during the first few days. It is spread through the air by coughing and sneezing. It may also be spread by direct contact (touching the sick person and then touching your own eyes, nose, or mouth). Frequent handwashing will decrease risk of spread. Most viral illnesses go away within 7 to 10 days with rest and simple home remedies. Sometimes the illness may last for several weeks. Antibiotics will not kill a virus, and they are generally not prescribed for this condition.    Home care  · If symptoms are severe, rest at home for the first 2 to 3 days. When you resume activity, don't let yourself get too tired.  · Avoid being exposed to cigarette smoke (yours or others).  · You may use acetaminophen or ibuprofen to control pain and fever, unless another medicine was prescribed. (Note: If you have chronic liver or kidney disease, have ever had a stomach ulcer or gastrointestinal bleeding, or are taking blood-thinning medicines, talk with your healthcare provider before using these medicines.) Aspirin should never be given to anyone under 18 years of age who is ill with a viral infection or fever. It may cause severe liver or brain damage.  · Your appetite may be poor, so a light diet is fine. Avoid dehydration by drinking 6 to 8 glasses of fluids per day (water, soft drinks, juices, tea, or soup). Extra fluids will help loosen secretions in the nose and lungs.  · Over-the-counter cold medicines will not shorten the length of time youre sick, but they may be helpful for the following symptoms: cough, sore throat, and nasal and sinus congestion. (Note: Do not use decongestants if you have high blood pressure.)  Follow-up care  Follow up with your healthcare provider, or as advised.  When to seek medical advice  Call your healthcare provider right away if any  of these occur:  · Cough with lots of colored sputum (mucus)  · Severe headache; face, neck, or ear pain  · Difficulty swallowing due to throat pain  · Fever of 100.4°F (38°C)  Call 911, or get immediate medical care  Call emergency services right away if any of these occur:  · Chest pain, shortness of breath, wheezing, or difficulty breathing  · Coughing up blood  · Inability to swallow due to throat pain  Date Last Reviewed: 9/13/2015  © 6324-4350 [employer]. [address] [place] [id number]. All rights reserved. This information is not intended as a substitute for professional medical care. Always follow your healthcare professional's instructions.

## 2019-01-28 ENCOUNTER — PATIENT MESSAGE (OUTPATIENT)
Dept: ADMINISTRATIVE | Facility: HOSPITAL | Age: 24
End: 2019-01-28

## 2019-03-08 ENCOUNTER — PATIENT MESSAGE (OUTPATIENT)
Dept: INTERNAL MEDICINE | Facility: CLINIC | Age: 24
End: 2019-03-08

## 2019-03-08 DIAGNOSIS — Z02.0 SCHOOL PHYSICAL EXAM: Primary | ICD-10-CM

## 2019-03-09 NOTE — TELEPHONE ENCOUNTER
Labs ordered for titers for Hep B. Not certain about an expiration date, as I have never seen that question asked before. Can have her send a pic or a copy of her form.

## 2019-03-11 ENCOUNTER — PATIENT MESSAGE (OUTPATIENT)
Dept: INTERNAL MEDICINE | Facility: CLINIC | Age: 24
End: 2019-03-11

## 2019-03-11 DIAGNOSIS — Z01.84 IMMUNITY STATUS TESTING: Primary | ICD-10-CM

## 2019-03-13 ENCOUNTER — TELEPHONE (OUTPATIENT)
Dept: INTERNAL MEDICINE | Facility: CLINIC | Age: 24
End: 2019-03-13

## 2019-03-13 ENCOUNTER — LAB VISIT (OUTPATIENT)
Dept: LAB | Facility: HOSPITAL | Age: 24
End: 2019-03-13
Attending: FAMILY MEDICINE
Payer: COMMERCIAL

## 2019-03-13 DIAGNOSIS — Z11.1 SCREENING FOR TUBERCULOSIS: Primary | ICD-10-CM

## 2019-03-13 DIAGNOSIS — Z01.84 IMMUNITY STATUS TESTING: ICD-10-CM

## 2019-03-13 DIAGNOSIS — Z02.0 SCHOOL PHYSICAL EXAM: ICD-10-CM

## 2019-03-13 PROCEDURE — 86762 RUBELLA ANTIBODY: CPT

## 2019-03-13 NOTE — TELEPHONE ENCOUNTER
Pt here now having titers done. She is now stating that her paperwork shows she can have the TB gold test done instead of getting the TB test. Can you order the TB gold test on her?

## 2019-03-13 NOTE — TELEPHONE ENCOUNTER
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 TB test was on campus in Nov 2018. [facility] is requiring to have 2 done this year. Call when ready to .tc

## 2019-03-14 ENCOUNTER — PATIENT MESSAGE (OUTPATIENT)
Dept: INTERNAL MEDICINE | Facility: CLINIC | Age: 24
End: 2019-03-14

## 2019-03-14 DIAGNOSIS — Z23 NEED FOR VARICELLA VACCINE: Primary | ICD-10-CM

## 2019-03-14 LAB
RUBV IGG SER-ACNC: 50.6 IU/ML
RUBV IGG SER-IMP: REACTIVE

## 2019-03-15 ENCOUNTER — PATIENT MESSAGE (OUTPATIENT)
Dept: INTERNAL MEDICINE | Facility: CLINIC | Age: 24
End: 2019-03-15

## 2019-03-15 NOTE — TELEPHONE ENCOUNTER
Unfortunately it looks like the wrong labs  Were cancelled. She needed the varicella titer not the rubella titer. Can we see if she wants to come back and redraw? If not, then we just need to go ahead and get her started on the varicella vaccine series (2 shots) again. The quantiferon test is still pending.     We can see if we can refund back her cost for the rubella test.

## 2019-03-18 ENCOUNTER — PATIENT MESSAGE (OUTPATIENT)
Dept: INTERNAL MEDICINE | Facility: CLINIC | Age: 24
End: 2019-03-18

## 2019-03-20 ENCOUNTER — CLINICAL SUPPORT (OUTPATIENT)
Dept: INTERNAL MEDICINE | Facility: CLINIC | Age: 24
End: 2019-03-20
Payer: COMMERCIAL

## 2019-03-20 PROCEDURE — 90716 VAR VACCINE LIVE SUBQ: CPT | Mod: S$GLB,,, | Performed by: FAMILY MEDICINE

## 2019-03-20 PROCEDURE — 90716 VARICELLA VACCINE SQ: ICD-10-PCS | Mod: S$GLB,,, | Performed by: FAMILY MEDICINE

## 2019-03-20 PROCEDURE — 90471 IMMUNIZATION ADMIN: CPT | Mod: S$GLB,,, | Performed by: FAMILY MEDICINE

## 2019-03-20 PROCEDURE — 90471 VARICELLA VACCINE SQ: ICD-10-PCS | Mod: S$GLB,,, | Performed by: FAMILY MEDICINE

## 2019-03-20 NOTE — PROGRESS NOTES
After obtaining consent, and per orders of Dr. Madhuri Otto, Varicella injection given by Aye Tyler LPN. Patient instructed to remain in clinic for 20 minutes afterwards, and to report any adverse reaction to me immediately.    Aye Tyler LPN

## 2019-04-25 ENCOUNTER — CLINICAL SUPPORT (OUTPATIENT)
Dept: INTERNAL MEDICINE | Facility: CLINIC | Age: 24
End: 2019-04-25
Payer: COMMERCIAL

## 2019-04-25 PROCEDURE — 90716 VARICELLA VACCINE SQ: ICD-10-PCS | Mod: S$GLB,,, | Performed by: FAMILY MEDICINE

## 2019-04-25 PROCEDURE — 90716 VAR VACCINE LIVE SUBQ: CPT | Mod: S$GLB,,, | Performed by: FAMILY MEDICINE

## 2019-04-25 PROCEDURE — 90471 VARICELLA VACCINE SQ: ICD-10-PCS | Mod: S$GLB,,, | Performed by: FAMILY MEDICINE

## 2019-04-25 PROCEDURE — 90471 IMMUNIZATION ADMIN: CPT | Mod: S$GLB,,, | Performed by: FAMILY MEDICINE

## 2019-05-09 ENCOUNTER — PATIENT MESSAGE (OUTPATIENT)
Dept: ADMINISTRATIVE | Facility: HOSPITAL | Age: 24
End: 2019-05-09

## 2019-12-12 ENCOUNTER — PATIENT MESSAGE (OUTPATIENT)
Dept: INTERNAL MEDICINE | Facility: CLINIC | Age: 24
End: 2019-12-12

## 2020-02-19 ENCOUNTER — PATIENT OUTREACH (OUTPATIENT)
Dept: ADMINISTRATIVE | Facility: HOSPITAL | Age: 25
End: 2020-02-19

## 2020-04-08 ENCOUNTER — PATIENT MESSAGE (OUTPATIENT)
Dept: INTERNAL MEDICINE | Facility: CLINIC | Age: 25
End: 2020-04-08

## 2020-04-08 DIAGNOSIS — Z00.00 ROUTINE GENERAL MEDICAL EXAMINATION AT A HEALTH CARE FACILITY: Primary | ICD-10-CM

## 2020-04-08 NOTE — TELEPHONE ENCOUNTER
Does she have to do all the titers again for a new school or just regular routine annual labs +_TB gold test? Link the varicella IgG test also please

## 2020-04-15 ENCOUNTER — PATIENT MESSAGE (OUTPATIENT)
Dept: INTERNAL MEDICINE | Facility: CLINIC | Age: 25
End: 2020-04-15

## 2020-04-22 ENCOUNTER — LAB VISIT (OUTPATIENT)
Dept: LAB | Facility: HOSPITAL | Age: 25
End: 2020-04-22
Attending: FAMILY MEDICINE
Payer: COMMERCIAL

## 2020-04-22 DIAGNOSIS — Z00.00 ROUTINE GENERAL MEDICAL EXAMINATION AT A HEALTH CARE FACILITY: ICD-10-CM

## 2020-04-22 LAB
ALBUMIN SERPL BCP-MCNC: 4.4 G/DL (ref 3.5–5.2)
ALP SERPL-CCNC: 47 U/L (ref 55–135)
ALT SERPL W/O P-5'-P-CCNC: 18 U/L (ref 10–44)
ANION GAP SERPL CALC-SCNC: 12 MMOL/L (ref 8–16)
AST SERPL-CCNC: 17 U/L (ref 10–40)
BASOPHILS # BLD AUTO: 0.02 K/UL (ref 0–0.2)
BASOPHILS NFR BLD: 0.4 % (ref 0–1.9)
BILIRUB SERPL-MCNC: 0.8 MG/DL (ref 0.1–1)
BUN SERPL-MCNC: 16 MG/DL (ref 6–20)
CALCIUM SERPL-MCNC: 9.7 MG/DL (ref 8.7–10.5)
CHLORIDE SERPL-SCNC: 103 MMOL/L (ref 95–110)
CHOLEST SERPL-MCNC: 197 MG/DL (ref 120–199)
CHOLEST/HDLC SERPL: 2.8 {RATIO} (ref 2–5)
CO2 SERPL-SCNC: 23 MMOL/L (ref 23–29)
CREAT SERPL-MCNC: 1.2 MG/DL (ref 0.5–1.4)
DIFFERENTIAL METHOD: ABNORMAL
EOSINOPHIL # BLD AUTO: 0.1 K/UL (ref 0–0.5)
EOSINOPHIL NFR BLD: 1.4 % (ref 0–8)
ERYTHROCYTE [DISTWIDTH] IN BLOOD BY AUTOMATED COUNT: 12.4 % (ref 11.5–14.5)
EST. GFR  (AFRICAN AMERICAN): >60 ML/MIN/1.73 M^2
EST. GFR  (NON AFRICAN AMERICAN): >60 ML/MIN/1.73 M^2
GLUCOSE SERPL-MCNC: 75 MG/DL (ref 70–110)
HCT VFR BLD AUTO: 41.6 % (ref 37–48.5)
HDLC SERPL-MCNC: 70 MG/DL (ref 40–75)
HDLC SERPL: 35.5 % (ref 20–50)
HGB BLD-MCNC: 13.1 G/DL (ref 12–16)
IMM GRANULOCYTES # BLD AUTO: 0.01 K/UL (ref 0–0.04)
IMM GRANULOCYTES NFR BLD AUTO: 0.2 % (ref 0–0.5)
LDLC SERPL CALC-MCNC: 107.2 MG/DL (ref 63–159)
LYMPHOCYTES # BLD AUTO: 2.2 K/UL (ref 1–4.8)
LYMPHOCYTES NFR BLD: 39.9 % (ref 18–48)
MCH RBC QN AUTO: 31.3 PG (ref 27–31)
MCHC RBC AUTO-ENTMCNC: 31.5 G/DL (ref 32–36)
MCV RBC AUTO: 100 FL (ref 82–98)
MONOCYTES # BLD AUTO: 0.4 K/UL (ref 0.3–1)
MONOCYTES NFR BLD: 7.9 % (ref 4–15)
NEUTROPHILS # BLD AUTO: 2.8 K/UL (ref 1.8–7.7)
NEUTROPHILS NFR BLD: 50.2 % (ref 38–73)
NONHDLC SERPL-MCNC: 127 MG/DL
NRBC BLD-RTO: 0 /100 WBC
PLATELET # BLD AUTO: 226 K/UL (ref 150–350)
PMV BLD AUTO: 10.9 FL (ref 9.2–12.9)
POTASSIUM SERPL-SCNC: 3.9 MMOL/L (ref 3.5–5.1)
PROT SERPL-MCNC: 7.2 G/DL (ref 6–8.4)
RBC # BLD AUTO: 4.18 M/UL (ref 4–5.4)
SODIUM SERPL-SCNC: 138 MMOL/L (ref 136–145)
TRIGL SERPL-MCNC: 99 MG/DL (ref 30–150)
TSH SERPL DL<=0.005 MIU/L-ACNC: 1.79 UIU/ML (ref 0.4–4)
WBC # BLD AUTO: 5.57 K/UL (ref 3.9–12.7)

## 2020-04-22 PROCEDURE — 86592 SYPHILIS TEST NON-TREP QUAL: CPT

## 2020-04-22 PROCEDURE — 80053 COMPREHEN METABOLIC PANEL: CPT

## 2020-04-22 PROCEDURE — 80074 ACUTE HEPATITIS PANEL: CPT

## 2020-04-22 PROCEDURE — 85025 COMPLETE CBC W/AUTO DIFF WBC: CPT

## 2020-04-22 PROCEDURE — 84443 ASSAY THYROID STIM HORMONE: CPT

## 2020-04-22 PROCEDURE — 80061 LIPID PANEL: CPT

## 2020-04-22 PROCEDURE — 36415 COLL VENOUS BLD VENIPUNCTURE: CPT | Mod: PO

## 2020-04-22 PROCEDURE — 86480 TB TEST CELL IMMUN MEASURE: CPT

## 2020-04-22 PROCEDURE — 86703 HIV-1/HIV-2 1 RESULT ANTBDY: CPT

## 2020-04-23 LAB
HAV IGM SERPL QL IA: NEGATIVE
HBV CORE IGM SERPL QL IA: NEGATIVE
HBV SURFACE AG SERPL QL IA: NEGATIVE
HCV AB SERPL QL IA: NEGATIVE
HIV 1+2 AB+HIV1 P24 AG SERPL QL IA: NEGATIVE
RPR SER QL: NORMAL

## 2020-04-24 LAB
GAMMA INTERFERON BACKGROUND BLD IA-ACNC: 0.08 IU/ML
M TB IFN-G CD4+ BCKGRND COR BLD-ACNC: -0.03 IU/ML
MITOGEN IGNF BCKGRD COR BLD-ACNC: 8.02 IU/ML
TB GOLD PLUS: NEGATIVE
TB2 - NIL: -0.03 IU/ML

## 2020-04-27 ENCOUNTER — OFFICE VISIT (OUTPATIENT)
Dept: INTERNAL MEDICINE | Facility: CLINIC | Age: 25
End: 2020-04-27
Payer: COMMERCIAL

## 2020-04-27 DIAGNOSIS — F41.1 GAD (GENERALIZED ANXIETY DISORDER): Primary | ICD-10-CM

## 2020-04-27 DIAGNOSIS — Z11.1 TUBERCULOSIS SCREENING: ICD-10-CM

## 2020-04-27 PROCEDURE — 99214 PR OFFICE/OUTPT VISIT, EST, LEVL IV, 30-39 MIN: ICD-10-PCS | Mod: 95,,, | Performed by: FAMILY MEDICINE

## 2020-04-27 PROCEDURE — 99214 OFFICE O/P EST MOD 30 MIN: CPT | Mod: 95,,, | Performed by: FAMILY MEDICINE

## 2020-04-27 RX ORDER — BUSPIRONE HYDROCHLORIDE 5 MG/1
5 TABLET ORAL 2 TIMES DAILY
Qty: 60 TABLET | Refills: 11 | Status: SHIPPED | OUTPATIENT
Start: 2020-04-27 | End: 2020-06-18 | Stop reason: SDUPTHER

## 2020-04-27 NOTE — PATIENT INSTRUCTIONS
Anxiety management:    Stress management: Limit potential triggers by managing stress levels. Keep an eye on pressures and deadlines, organize daunting tasks in to-do lists, and take enough time off from professional or educational obligations.   Relaxation techniques: Certain measures can help reduce signs of anxiety, including deep-breathing exercises, long baths, meditation, yoga, and resting in the dark.   Exercises to replace negative thoughts with positive ones: Write down a list of any negative thoughts, and make another list of positive thoughts to replace them. Picturing yourself successfully facing and conquering a specific fear can also provide benefits if the anxiety symptoms link to a specific stressor.   Support network: Talk to a person who is supportive, such as a family member or friend. Avoid storing up and suppressing anxious feelings as this can worsen anxiety disorders.   Exercise: Physical exertion and an active lifestyle can improve self-image and trigger the release of chemicals in the brain that stimulate positive emotion.    Taking the following steps will help keep anxious emotions in check and prevent the development of a disorder, including:   Consume less caffeine, tea, soda, and chocolate.   Check with a doctor or pharmacist before using over-the-counter (OTC) or herbal remedies for chemicals that might make anxiety worse.   Keep up a balanced, nutritious diet.   Regular sleep patterns can be helpful.   Avoid alcohol, cannabis, and other recreational drugs.

## 2020-04-27 NOTE — PROGRESS NOTES
"Subjective:      Patient ID: Weston Acuña is a 24 y.o. female.    Chief Complaint: Anxiety    Disclaimer:  This note is prepared using voice recognition software and as such is likely to have errors and has not been proof read. Please contact me for questions.     The patient location is:home  The chief complaint leading to consultation is: followup / my chart request, anxiety, labs.   Visit type: Virtual visit with synchronous audio and video  Total time spent with patient:120pm -134pm  Each patient to whom he or she provides medical services by telemedicine is:  (1) informed of the relationship between the physician and patient and the respective role of any other health care provider with respect to management of the patient; and (2) notified that he or she may decline to receive medical services by telemedicine and may withdraw from such care at any time.    Notes:     "I don't really have anything new to talk to her about other than having some anxiety, which I kind of discussed with her the last time I saw her. Would you recommend doing a telemedicine visit or just holding off until I can see her in person? 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 Been away from home. 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 Is slightly tearful today.  On break currently.  Is affecting sleep and falling asleep. Not taking anything. Wakes up a few times a night.  Not refreshed. Only has 1 more year. Will have the clinical rotoations in jan. And will have some panic attacks at times right before.  With tests also anxiety.     Also reviewed labs for schooling, including tb screening. Normal thyroid no anemia.     Anxiety   Presents for initial visit. Onset was 1 to 6 months ago. The problem has been gradually worsening. Symptoms include compulsions, decreased concentration, insomnia, irritability, nervous/anxious behavior, obsessions, panic " and restlessness. Patient reports no chest pain, confusion, depressed mood or palpitations. Symptoms occur most days. The severity of symptoms is moderate. Exacerbated by: school for OT. The quality of sleep is non-restorative. Nighttime awakenings: several.     There are no known risk factors. There is no history of anemia, anxiety/panic attacks, arrhythmia, asthma, bipolar disorder, CAD, CHF, depression, fibromyalgia or hyperthyroidism. Past treatments include lifestyle changes. Compliance with prior treatments has been good.       Lab Results   Component Value Date    WBC 5.57 04/22/2020    HGB 13.1 04/22/2020    HCT 41.6 04/22/2020     04/22/2020    CHOL 197 04/22/2020    TRIG 99 04/22/2020    HDL 70 04/22/2020    ALT 18 04/22/2020    AST 17 04/22/2020     04/22/2020    K 3.9 04/22/2020     04/22/2020    CREATININE 1.2 04/22/2020    BUN 16 04/22/2020    CO2 23 04/22/2020    TSH 1.791 04/22/2020       No image results found.        Review of Systems   Constitutional: Positive for irritability. Negative for activity change, appetite change, fatigue and unexpected weight change.   HENT: Negative for hearing loss, rhinorrhea and trouble swallowing.    Eyes: Negative for discharge and visual disturbance.   Respiratory: Negative for chest tightness and wheezing.    Cardiovascular: Negative for chest pain and palpitations.   Gastrointestinal: Negative for blood in stool, constipation, diarrhea and vomiting.   Endocrine: Negative for polydipsia and polyuria.   Genitourinary: Negative for difficulty urinating, dysuria, hematuria and menstrual problem.   Musculoskeletal: Negative for arthralgias, joint swelling and neck pain.   Neurological: Negative for weakness and headaches.   Psychiatric/Behavioral: Positive for decreased concentration and sleep disturbance. Negative for confusion and dysphoric mood. The patient is nervous/anxious and has insomnia.      Objective:   There were no vitals filed for  this visit.  Physical Exam   Constitutional: She appears well-developed and well-nourished. She is active and cooperative. She does not have a sickly appearance. She does not appear ill. No distress.   HENT:   Head: Normocephalic and atraumatic.   Right Ear: External ear normal.   Left Ear: External ear normal.   Pulmonary/Chest: Effort normal.   Neurological: She is alert.   Psychiatric: Her behavior is normal. Judgment and thought content normal. Her mood appears anxious. Her affect is not angry, not blunt, not labile and not inappropriate. Her speech is not rapid and/or pressured, not delayed, not tangential and not slurred. She is not agitated, not aggressive, not hyperactive, not slowed, not withdrawn, not actively hallucinating and not combative. Thought content is not paranoid and not delusional. Cognition and memory are normal. Cognition and memory are not impaired. She does not express impulsivity or inappropriate judgment. She does not exhibit a depressed mood. She expresses no homicidal and no suicidal ideation. She expresses no suicidal plans and no homicidal plans. She is communicative. She exhibits normal recent memory and normal remote memory. She is attentive.   Vitals reviewed.    Assessment:     1. DAVID (generalized anxiety disorder)    2. Tuberculosis screening      Plan:   Weston was seen today for anxiety.    Diagnoses and all orders for this visit:    DAVID (generalized anxiety disorder)  Comments:  new, situational with schooling for OT, in [place] this summer. will do trial of buspar 5mg up to bid dosing prn anxiety.     Tuberculosis screening  Comments:  reviewed quanteferon testing for the patient was negative. ok to proceed with OT school.  no restrictions.     Other orders  -     busPIRone (BUSPAR) 5 MG Tab; Take 1 tablet (5 mg total) by mouth 2 (two) times daily.            Follow up in about 1 year (around 4/27/2021) for physical with Dr ANTHONY.    Patient Instructions   Anxiety  management:    Stress management: Limit potential triggers by managing stress levels. Keep an eye on pressures and deadlines, organize daunting tasks in to-do lists, and take enough time off from professional or educational obligations.   Relaxation techniques: Certain measures can help reduce signs of anxiety, including deep-breathing exercises, long baths, meditation, yoga, and resting in the dark.   Exercises to replace negative thoughts with positive ones: Write down a list of any negative thoughts, and make another list of positive thoughts to replace them. Picturing yourself successfully facing and conquering a specific fear can also provide benefits if the anxiety symptoms link to a specific stressor.   Support network: Talk to a person who is supportive, such as a family member or friend. Avoid storing up and suppressing anxious feelings as this can worsen anxiety disorders.   Exercise: Physical exertion and an active lifestyle can improve self-image and trigger the release of chemicals in the brain that stimulate positive emotion.    Taking the following steps will help keep anxious emotions in check and prevent the development of a disorder, including:   Consume less caffeine, tea, soda, and chocolate.   Check with a doctor or pharmacist before using over-the-counter (OTC) or herbal remedies for chemicals that might make anxiety worse.   Keep up a balanced, nutritious diet.   Regular sleep patterns can be helpful.   Avoid alcohol, cannabis, and other recreational drugs.

## 2020-06-17 ENCOUNTER — PATIENT MESSAGE (OUTPATIENT)
Dept: INTERNAL MEDICINE | Facility: CLINIC | Age: 25
End: 2020-06-17

## 2020-06-18 RX ORDER — BUSPIRONE HYDROCHLORIDE 5 MG/1
5 TABLET ORAL 2 TIMES DAILY
Qty: 180 TABLET | Refills: 3 | Status: SHIPPED | OUTPATIENT
Start: 2020-06-18 | End: 2020-06-21 | Stop reason: SDUPTHER

## 2020-06-22 RX ORDER — BUSPIRONE HYDROCHLORIDE 5 MG/1
5 TABLET ORAL 2 TIMES DAILY
Qty: 180 TABLET | Refills: 3 | Status: SHIPPED | OUTPATIENT
Start: 2020-06-22 | End: 2021-02-01

## 2020-12-27 ENCOUNTER — CLINICAL SUPPORT (OUTPATIENT)
Dept: URGENT CARE | Facility: CLINIC | Age: 25
End: 2020-12-27
Payer: COMMERCIAL

## 2020-12-27 VITALS
BODY MASS INDEX: 25.01 KG/M2 | WEIGHT: 165 LBS | HEART RATE: 90 BPM | TEMPERATURE: 98 F | HEIGHT: 68 IN | RESPIRATION RATE: 18 BRPM | OXYGEN SATURATION: 99 %

## 2020-12-27 DIAGNOSIS — Z20.822 EXPOSURE TO COVID-19 VIRUS: Primary | ICD-10-CM

## 2020-12-27 LAB
CTP QC/QA: YES
SARS-COV-2 RDRP RESP QL NAA+PROBE: NEGATIVE

## 2020-12-27 PROCEDURE — 99211 PR OFFICE/OUTPT VISIT, EST, LEVL I: ICD-10-PCS | Mod: S$GLB,,, | Performed by: INTERNAL MEDICINE

## 2020-12-27 PROCEDURE — 99211 OFF/OP EST MAY X REQ PHY/QHP: CPT | Mod: S$GLB,,, | Performed by: INTERNAL MEDICINE

## 2020-12-27 PROCEDURE — U0002: ICD-10-PCS | Mod: QW,S$GLB,, | Performed by: INTERNAL MEDICINE

## 2020-12-27 PROCEDURE — U0002 COVID-19 LAB TEST NON-CDC: HCPCS | Mod: QW,S$GLB,, | Performed by: INTERNAL MEDICINE

## 2020-12-27 NOTE — PATIENT INSTRUCTIONS
Your test was NEGATIVE for COVID-19 (coronavirus).      You may leave home and/or return to work when the following conditions are met:  · 24 hours fever free without fever-reducing medications AND  · Improved symptoms  · You have not met the conditions of a close contact     What counts as a close contact?  · You were within 6 feet of someone who has COVID-19 for a total of 15 minutes or more (masked or unmasked).  · You provided care at home to someone who is sick with COVID-19.  · You had direct physical contact with the person (hugged or kissed them).  · You shared eating or drinking utensils.  · They sneezed, coughed, or somehow got respiratory droplets on you.     If you had a close contact:  · If possible, it is recommended that you quarantine for 14 days from the time of contact regardless of your test status.  · If you have no symptoms, quarantine may be stopped early at 10 days, but this carries a small risk of spreading the virus.  · If you have no symptoms and you have a negative COVID test on day 5 or later, quarantine may be stopped after day 7, but this also carries a small risk of spreading the virus.     Additional instructions:  · Social distance per your local guidelines  · Call ahead before visiting your doctor.  · Wear a mask when around others who do not live in your household.  · Cover your coughs and sneezes.  · Wash hands or use hand  often.      If your symptoms worsen or if you have any other concerns, please contact [facility] at [phone].     Sincerely,    Rogers Collins, RT

## 2020-12-30 ENCOUNTER — CLINICAL SUPPORT (OUTPATIENT)
Dept: URGENT CARE | Facility: CLINIC | Age: 25
End: 2020-12-30
Payer: COMMERCIAL

## 2020-12-30 VITALS
OXYGEN SATURATION: 99 % | TEMPERATURE: 99 F | HEART RATE: 82 BPM | RESPIRATION RATE: 16 BRPM | SYSTOLIC BLOOD PRESSURE: 118 MMHG | DIASTOLIC BLOOD PRESSURE: 76 MMHG

## 2020-12-30 DIAGNOSIS — R05.9 COUGH: ICD-10-CM

## 2020-12-30 DIAGNOSIS — Z20.822 EXPOSURE TO COVID-19 VIRUS: Primary | ICD-10-CM

## 2020-12-30 DIAGNOSIS — U07.1 COVID-19 VIRUS DETECTED: ICD-10-CM

## 2020-12-30 LAB
CTP QC/QA: YES
SARS-COV-2 RDRP RESP QL NAA+PROBE: POSITIVE

## 2020-12-30 PROCEDURE — 99211 PR OFFICE/OUTPT VISIT, EST, LEVL I: ICD-10-PCS | Mod: S$GLB,,, | Performed by: NURSE PRACTITIONER

## 2020-12-30 PROCEDURE — U0002 COVID-19 LAB TEST NON-CDC: HCPCS | Mod: QW,S$GLB,, | Performed by: NURSE PRACTITIONER

## 2020-12-30 PROCEDURE — 99211 OFF/OP EST MAY X REQ PHY/QHP: CPT | Mod: S$GLB,,, | Performed by: NURSE PRACTITIONER

## 2020-12-30 PROCEDURE — U0002: ICD-10-PCS | Mod: QW,S$GLB,, | Performed by: NURSE PRACTITIONER

## 2020-12-30 NOTE — PROGRESS NOTES
CDC Testing and Quarantine Guidelines for Exposure:         A close exposure is defined as anyone who has had an exposure (masked or unmasked) to a known COVID -19 positive person within 6 ft for longer than 15 minutes. If your exposure meets this definition you are required by CDC guidelines to quarantine for at least 7-10 days from time of exposure. The CDC states that a test can be performed for an asymptomatic patient (someone who does not have any symptoms) after a close exposure, and that a test should be done if you develop symptoms after a close exposure as described above.  Specifically, you can test at day 5 or later if asymptomatic, in order to get released from quarantine on day 7 or later.  If you develop symptoms sooner, you should test when your symptoms start.  If you meet the definition of a close exposure, it will not matter whether you are experiencing symptoms- a quarantine for at least 7-10 days after a close exposure is required by CDC guidelines.  Please note, if you decide to test as an asymptomatic during your quarantine and you are positive, you will be restarting your quarantine and moving from a possible 10 day quarantine (if you do not test), to a 11 day or greater quarantine.  The CDC also suggests people still monitor for symptoms for a full 14 days and remember that the shorter quarantine options do not replace initial CDC guidance.  The CDC continues to recommend quarantining for 14 days as the best way to reduce risk for spreading COVID-19 - however, this is only a recommendation.  If your exposure does not meet the above definition, you can return to your normal daily activities to include social distancing, wearing a mask and frequent handwashing          Patient presents to the clinic with COVID concerns, patient was given the option to see a provider.  Patient is symptomatic and elects to not see a provider, they were given a handout which recognizes their decision to not see  the provider today, as well as recommendations to follow up with their PCP.  If their symptoms worsen, they will need to follow up with their PCP, any urgent care clinic, or ER for further evaluation.

## 2021-02-01 ENCOUNTER — PATIENT MESSAGE (OUTPATIENT)
Dept: PRIMARY CARE CLINIC | Facility: CLINIC | Age: 26
End: 2021-02-01

## 2021-02-01 RX ORDER — BUSPIRONE HYDROCHLORIDE 10 MG/1
10 TABLET ORAL 2 TIMES DAILY
Qty: 180 TABLET | Refills: 3 | Status: SHIPPED | OUTPATIENT
Start: 2021-02-01 | End: 2021-05-12 | Stop reason: SDUPTHER

## 2021-02-01 RX ORDER — BUSPIRONE HYDROCHLORIDE 10 MG/1
10 TABLET ORAL 2 TIMES DAILY
Qty: 180 TABLET | Refills: 3 | Status: SHIPPED | OUTPATIENT
Start: 2021-02-01 | End: 2021-02-01 | Stop reason: SDUPTHER

## 2021-03-25 ENCOUNTER — PATIENT MESSAGE (OUTPATIENT)
Dept: PRIMARY CARE CLINIC | Facility: CLINIC | Age: 26
End: 2021-03-25

## 2021-04-22 ENCOUNTER — PATIENT MESSAGE (OUTPATIENT)
Dept: ADMINISTRATIVE | Facility: HOSPITAL | Age: 26
End: 2021-04-22

## 2021-05-04 ENCOUNTER — PATIENT MESSAGE (OUTPATIENT)
Dept: PRIMARY CARE CLINIC | Facility: CLINIC | Age: 26
End: 2021-05-04

## 2021-05-07 ENCOUNTER — LAB VISIT (OUTPATIENT)
Dept: LAB | Facility: HOSPITAL | Age: 26
End: 2021-05-07
Attending: NURSE PRACTITIONER
Payer: COMMERCIAL

## 2021-05-07 ENCOUNTER — TELEPHONE (OUTPATIENT)
Dept: PRIMARY CARE CLINIC | Facility: CLINIC | Age: 26
End: 2021-05-07

## 2021-05-07 DIAGNOSIS — Z11.1 SCREENING FOR TUBERCULOSIS: Primary | ICD-10-CM

## 2021-05-07 DIAGNOSIS — Z11.1 SCREENING FOR TUBERCULOSIS: ICD-10-CM

## 2021-05-07 PROCEDURE — 86480 TB TEST CELL IMMUN MEASURE: CPT | Performed by: NURSE PRACTITIONER

## 2021-05-07 PROCEDURE — 36415 COLL VENOUS BLD VENIPUNCTURE: CPT | Mod: PO | Performed by: NURSE PRACTITIONER

## 2021-05-11 LAB
GAMMA INTERFERON BACKGROUND BLD IA-ACNC: 0.13 IU/ML
M TB IFN-G CD4+ BCKGRND COR BLD-ACNC: 0.01 IU/ML
MITOGEN IGNF BCKGRD COR BLD-ACNC: 8.23 IU/ML
TB GOLD PLUS: NEGATIVE
TB2 - NIL: -0.02 IU/ML

## 2021-05-12 ENCOUNTER — OFFICE VISIT (OUTPATIENT)
Dept: PRIMARY CARE CLINIC | Facility: CLINIC | Age: 26
End: 2021-05-12
Payer: COMMERCIAL

## 2021-05-12 DIAGNOSIS — F41.1 GAD (GENERALIZED ANXIETY DISORDER): ICD-10-CM

## 2021-05-12 DIAGNOSIS — F43.21 GRIEF REACTION: Primary | ICD-10-CM

## 2021-05-12 PROCEDURE — 99214 OFFICE O/P EST MOD 30 MIN: CPT | Mod: 95,,, | Performed by: FAMILY MEDICINE

## 2021-05-12 PROCEDURE — 99214 PR OFFICE/OUTPT VISIT, EST, LEVL IV, 30-39 MIN: ICD-10-PCS | Mod: 95,,, | Performed by: FAMILY MEDICINE

## 2021-05-12 RX ORDER — BUSPIRONE HYDROCHLORIDE 10 MG/1
10 TABLET ORAL 2 TIMES DAILY
Qty: 180 TABLET | Refills: 3 | Status: SHIPPED | OUTPATIENT
Start: 2021-05-12 | End: 2022-01-10 | Stop reason: SDUPTHER

## 2021-05-12 RX ORDER — ESCITALOPRAM OXALATE 5 MG/1
TABLET ORAL
Qty: 53 TABLET | Refills: 0 | Status: SHIPPED | OUTPATIENT
Start: 2021-05-12 | End: 2021-06-04

## 2021-06-01 ENCOUNTER — PATIENT MESSAGE (OUTPATIENT)
Dept: PRIMARY CARE CLINIC | Facility: CLINIC | Age: 26
End: 2021-06-01

## 2021-06-04 RX ORDER — ESCITALOPRAM OXALATE 10 MG/1
10 TABLET ORAL DAILY
Qty: 90 TABLET | Refills: 1 | Status: SHIPPED | OUTPATIENT
Start: 2021-06-04 | End: 2021-09-06 | Stop reason: SDUPTHER

## 2021-06-10 ENCOUNTER — PATIENT MESSAGE (OUTPATIENT)
Dept: PRIMARY CARE CLINIC | Facility: CLINIC | Age: 26
End: 2021-06-10

## 2021-06-16 ENCOUNTER — OFFICE VISIT (OUTPATIENT)
Dept: URGENT CARE | Facility: CLINIC | Age: 26
End: 2021-06-16
Payer: COMMERCIAL

## 2021-06-16 VITALS
HEIGHT: 68 IN | BODY MASS INDEX: 25.76 KG/M2 | HEART RATE: 81 BPM | RESPIRATION RATE: 20 BRPM | OXYGEN SATURATION: 98 % | DIASTOLIC BLOOD PRESSURE: 78 MMHG | TEMPERATURE: 98 F | WEIGHT: 170 LBS | SYSTOLIC BLOOD PRESSURE: 117 MMHG

## 2021-06-16 DIAGNOSIS — R09.82 ALLERGIC RHINITIS WITH POSTNASAL DRIP: Primary | ICD-10-CM

## 2021-06-16 DIAGNOSIS — J02.9 PHARYNGITIS, UNSPECIFIED ETIOLOGY: ICD-10-CM

## 2021-06-16 DIAGNOSIS — R05.9 COUGH: ICD-10-CM

## 2021-06-16 DIAGNOSIS — J30.9 ALLERGIC RHINITIS WITH POSTNASAL DRIP: Primary | ICD-10-CM

## 2021-06-16 PROCEDURE — 99214 OFFICE O/P EST MOD 30 MIN: CPT | Mod: S$GLB,,, | Performed by: NURSE PRACTITIONER

## 2021-06-16 PROCEDURE — 99214 PR OFFICE/OUTPT VISIT, EST, LEVL IV, 30-39 MIN: ICD-10-PCS | Mod: S$GLB,,, | Performed by: NURSE PRACTITIONER

## 2021-06-16 RX ORDER — DEXBROMPHENIRAMINE MALEATE AND PHENYLEPHRINE HYDROCHLORIDE 2; 10 MG/1; MG/1
1 TABLET ORAL EVERY 6 HOURS PRN
Qty: 28 TABLET | Refills: 0 | Status: SHIPPED | OUTPATIENT
Start: 2021-06-16 | End: 2022-01-11 | Stop reason: ALTCHOICE

## 2021-06-16 RX ORDER — FLUTICASONE PROPIONATE 50 MCG
2 SPRAY, SUSPENSION (ML) NASAL DAILY
Qty: 16 G | Refills: 0 | Status: SHIPPED | OUTPATIENT
Start: 2021-06-16 | End: 2021-07-16

## 2021-06-19 ENCOUNTER — TELEPHONE (OUTPATIENT)
Dept: URGENT CARE | Facility: CLINIC | Age: 26
End: 2021-06-19

## 2021-09-07 RX ORDER — ESCITALOPRAM OXALATE 10 MG/1
10 TABLET ORAL DAILY
Qty: 90 TABLET | Refills: 1 | Status: SHIPPED | OUTPATIENT
Start: 2021-09-07 | End: 2021-12-11 | Stop reason: SDUPTHER

## 2021-09-20 ENCOUNTER — PATIENT MESSAGE (OUTPATIENT)
Dept: PRIMARY CARE CLINIC | Facility: CLINIC | Age: 26
End: 2021-09-20

## 2021-12-05 ENCOUNTER — OFFICE VISIT (OUTPATIENT)
Dept: URGENT CARE | Facility: CLINIC | Age: 26
End: 2021-12-05
Payer: COMMERCIAL

## 2021-12-05 VITALS
WEIGHT: 180 LBS | TEMPERATURE: 101 F | HEIGHT: 68 IN | BODY MASS INDEX: 27.28 KG/M2 | RESPIRATION RATE: 14 BRPM | SYSTOLIC BLOOD PRESSURE: 96 MMHG | HEART RATE: 77 BPM | OXYGEN SATURATION: 98 % | DIASTOLIC BLOOD PRESSURE: 57 MMHG

## 2021-12-05 DIAGNOSIS — Z20.822 ENCOUNTER FOR LABORATORY TESTING FOR COVID-19 VIRUS: ICD-10-CM

## 2021-12-05 DIAGNOSIS — J10.1 INFLUENZA A: Primary | ICD-10-CM

## 2021-12-05 LAB
CTP QC/QA: YES
CTP QC/QA: YES
POC MOLECULAR INFLUENZA A AGN: POSITIVE
POC MOLECULAR INFLUENZA B AGN: NEGATIVE
SARS-COV-2 RDRP RESP QL NAA+PROBE: NEGATIVE

## 2021-12-05 PROCEDURE — 99214 PR OFFICE/OUTPT VISIT, EST, LEVL IV, 30-39 MIN: ICD-10-PCS | Mod: S$GLB,,, | Performed by: STUDENT IN AN ORGANIZED HEALTH CARE EDUCATION/TRAINING PROGRAM

## 2021-12-05 PROCEDURE — 87502 INFLUENZA DNA AMP PROBE: CPT | Mod: QW,S$GLB,, | Performed by: STUDENT IN AN ORGANIZED HEALTH CARE EDUCATION/TRAINING PROGRAM

## 2021-12-05 PROCEDURE — 87502 POCT INFLUENZA A/B MOLECULAR: ICD-10-PCS | Mod: QW,S$GLB,, | Performed by: STUDENT IN AN ORGANIZED HEALTH CARE EDUCATION/TRAINING PROGRAM

## 2021-12-05 PROCEDURE — U0002: ICD-10-PCS | Mod: QW,S$GLB,, | Performed by: STUDENT IN AN ORGANIZED HEALTH CARE EDUCATION/TRAINING PROGRAM

## 2021-12-05 PROCEDURE — 99214 OFFICE O/P EST MOD 30 MIN: CPT | Mod: S$GLB,,, | Performed by: STUDENT IN AN ORGANIZED HEALTH CARE EDUCATION/TRAINING PROGRAM

## 2021-12-05 PROCEDURE — U0002 COVID-19 LAB TEST NON-CDC: HCPCS | Mod: QW,S$GLB,, | Performed by: STUDENT IN AN ORGANIZED HEALTH CARE EDUCATION/TRAINING PROGRAM

## 2021-12-05 RX ORDER — BENZONATATE 200 MG/1
200 CAPSULE ORAL 3 TIMES DAILY PRN
Qty: 20 CAPSULE | Refills: 0 | Status: SHIPPED | OUTPATIENT
Start: 2021-12-05 | End: 2021-12-05 | Stop reason: SDUPTHER

## 2021-12-05 RX ORDER — OSELTAMIVIR PHOSPHATE 75 MG/1
75 CAPSULE ORAL 2 TIMES DAILY
Qty: 10 CAPSULE | Refills: 0 | Status: SHIPPED | OUTPATIENT
Start: 2021-12-05 | End: 2021-12-05 | Stop reason: SDUPTHER

## 2021-12-05 RX ORDER — BENZONATATE 200 MG/1
200 CAPSULE ORAL 3 TIMES DAILY PRN
Qty: 20 CAPSULE | Refills: 0 | Status: SHIPPED | OUTPATIENT
Start: 2021-12-05 | End: 2021-12-15

## 2021-12-05 RX ORDER — OSELTAMIVIR PHOSPHATE 75 MG/1
75 CAPSULE ORAL 2 TIMES DAILY
Qty: 10 CAPSULE | Refills: 0 | Status: SHIPPED | OUTPATIENT
Start: 2021-12-05 | End: 2021-12-10

## 2021-12-08 ENCOUNTER — TELEPHONE (OUTPATIENT)
Dept: URGENT CARE | Facility: CLINIC | Age: 26
End: 2021-12-08
Payer: COMMERCIAL

## 2021-12-13 RX ORDER — ESCITALOPRAM OXALATE 10 MG/1
10 TABLET ORAL DAILY
Qty: 90 TABLET | Refills: 1 | Status: SHIPPED | OUTPATIENT
Start: 2021-12-13 | End: 2022-03-07 | Stop reason: SDUPTHER

## 2021-12-21 ENCOUNTER — PATIENT MESSAGE (OUTPATIENT)
Dept: PRIMARY CARE CLINIC | Facility: CLINIC | Age: 26
End: 2021-12-21
Payer: COMMERCIAL

## 2022-01-10 ENCOUNTER — PATIENT MESSAGE (OUTPATIENT)
Dept: PRIMARY CARE CLINIC | Facility: CLINIC | Age: 27
End: 2022-01-10
Payer: COMMERCIAL

## 2022-01-11 ENCOUNTER — OFFICE VISIT (OUTPATIENT)
Dept: PRIMARY CARE CLINIC | Facility: CLINIC | Age: 27
End: 2022-01-11
Payer: COMMERCIAL

## 2022-01-11 VITALS
HEART RATE: 76 BPM | TEMPERATURE: 98 F | WEIGHT: 189.13 LBS | BODY MASS INDEX: 28.66 KG/M2 | SYSTOLIC BLOOD PRESSURE: 118 MMHG | HEIGHT: 68 IN | DIASTOLIC BLOOD PRESSURE: 72 MMHG

## 2022-01-11 DIAGNOSIS — R05.9 COUGH: ICD-10-CM

## 2022-01-11 DIAGNOSIS — Z71.89 EDUCATED ABOUT COVID-19 VIRUS INFECTION: ICD-10-CM

## 2022-01-11 DIAGNOSIS — R51.9 ACUTE NONINTRACTABLE HEADACHE, UNSPECIFIED HEADACHE TYPE: ICD-10-CM

## 2022-01-11 DIAGNOSIS — J02.9 SORE THROAT: Primary | ICD-10-CM

## 2022-01-11 LAB
CTP QC/QA: YES
SARS-COV-2 RDRP RESP QL NAA+PROBE: NEGATIVE

## 2022-01-11 PROCEDURE — 99999 PR PBB SHADOW E&M-EST. PATIENT-LVL III: ICD-10-PCS | Mod: PBBFAC,,, | Performed by: FAMILY MEDICINE

## 2022-01-11 PROCEDURE — 99214 OFFICE O/P EST MOD 30 MIN: CPT | Mod: S$GLB,,, | Performed by: FAMILY MEDICINE

## 2022-01-11 PROCEDURE — U0002 COVID-19 LAB TEST NON-CDC: HCPCS | Mod: QW,S$GLB,, | Performed by: FAMILY MEDICINE

## 2022-01-11 PROCEDURE — 99214 PR OFFICE/OUTPT VISIT, EST, LEVL IV, 30-39 MIN: ICD-10-PCS | Mod: S$GLB,,, | Performed by: FAMILY MEDICINE

## 2022-01-11 PROCEDURE — U0002: ICD-10-PCS | Mod: QW,S$GLB,, | Performed by: FAMILY MEDICINE

## 2022-01-11 PROCEDURE — 99999 PR PBB SHADOW E&M-EST. PATIENT-LVL III: CPT | Mod: PBBFAC,,, | Performed by: FAMILY MEDICINE

## 2022-01-11 RX ORDER — BENZONATATE 200 MG/1
200 CAPSULE ORAL 3 TIMES DAILY PRN
Qty: 30 CAPSULE | Refills: 0 | Status: SHIPPED | OUTPATIENT
Start: 2022-01-11 | End: 2022-01-11 | Stop reason: SDUPTHER

## 2022-01-11 RX ORDER — BACLOFEN 10 MG/1
10 TABLET ORAL DAILY
COMMUNITY
Start: 2021-12-10 | End: 2022-12-27

## 2022-01-11 RX ORDER — PSEUDOEPHEDRINE HCL 120 MG/1
120 TABLET, FILM COATED, EXTENDED RELEASE ORAL 2 TIMES DAILY
Qty: 20 TABLET | Refills: 0 | Status: SHIPPED | OUTPATIENT
Start: 2022-01-11 | End: 2022-05-10

## 2022-01-11 RX ORDER — PROMETHAZINE HYDROCHLORIDE AND DEXTROMETHORPHAN HYDROBROMIDE 6.25; 15 MG/5ML; MG/5ML
5 SYRUP ORAL EVERY 4 HOURS PRN
Qty: 180 ML | Refills: 0 | Status: SHIPPED | OUTPATIENT
Start: 2022-01-11 | End: 2022-01-21

## 2022-01-11 RX ORDER — MELOXICAM 7.5 MG/1
7.5 TABLET ORAL 2 TIMES DAILY
COMMUNITY
Start: 2021-12-10 | End: 2022-03-28

## 2022-01-11 RX ORDER — AZITHROMYCIN 250 MG/1
TABLET, FILM COATED ORAL
Qty: 6 TABLET | Refills: 0 | Status: SHIPPED | OUTPATIENT
Start: 2022-01-11 | End: 2022-01-16

## 2022-01-11 RX ORDER — GUAIFENESIN 1200 MG/1
TABLET, EXTENDED RELEASE ORAL
Qty: 20 TABLET | Refills: 1 | Status: SHIPPED | OUTPATIENT
Start: 2022-01-11 | End: 2022-03-28

## 2022-01-11 RX ORDER — BENZONATATE 200 MG/1
200 CAPSULE ORAL 3 TIMES DAILY PRN
Qty: 30 CAPSULE | Refills: 0 | Status: SHIPPED | OUTPATIENT
Start: 2022-01-11 | End: 2022-01-21

## 2022-01-11 RX ORDER — FLUTICASONE PROPIONATE 50 MCG
1 SPRAY, SUSPENSION (ML) NASAL DAILY
Qty: 16 G | Refills: 11 | Status: SHIPPED | OUTPATIENT
Start: 2022-01-11 | End: 2022-03-28

## 2022-01-11 NOTE — LETTER
[id number] [facility] ? [place], [address] ? Phone [phone] ? Fax [phone]           Return to Work/School    Patient: Weston Acuña  YOB: 1995   Date: 01/11/2022      To Whom It May Concern:     Weston Acuña was in contact with/seen in my office on 01/11/2022. COVID-19 is present in our communities across the [facility]. Not all patients are eligible or appropriate to be tested. In this situation, your employee meets the following criteria:     Weston Acuña has met the criteria for COVID-19 testing and has a NEGATIVE result. The employee can return to work wearing mask while having symptoms of sore throat and headache. If she develops fever, then she can reach out to my office and follow-up for additional medical advice.      If you have any questions or concerns, or if I can be of further assistance, please do not hesitate to contact me.     Sincerely,    Madhuri Otto MD

## 2022-01-11 NOTE — PROGRESS NOTES
Subjective:      Patient ID: Weston Acuña is a 26 y.o. female.    Chief Complaint: Sore Throat (Covid concerns, doing DARINEL tomorrow )    Disclaimer:  This note is prepared using voice recognition software and as such is likely to have errors and has not been proof read. Please contact me for questions.     Weston Acuña is a 26 y.o. female who presents today for upper respiratory illness. Sore throat and headache for last few days. Wanted to get covid tested. And been exposed at work with 2 co-workers and kids at work as well.   Has been vaccinated in Aug. No booster yet. Lost her mom due to covid.   Symptoms on day 4 now.   Hurts back with coughing and sneezing right now.   Had flu around thanksgiving.     Has ha but not taking nsaids due to darinel tomorrow.   Works at [employer].   Plans on going to [employer] this weekend as well if covid neg.           Lab Results   Component Value Date    WBC 5.57 04/22/2020    HGB 13.1 04/22/2020    HCT 41.6 04/22/2020     04/22/2020    CHOL 197 04/22/2020    TRIG 99 04/22/2020    HDL 70 04/22/2020    ALT 18 04/22/2020    AST 17 04/22/2020     04/22/2020    K 3.9 04/22/2020     04/22/2020    CREATININE 1.2 04/22/2020    BUN 16 04/22/2020    CO2 23 04/22/2020    TSH 1.791 04/22/2020       No image results found.        Review of Systems   Constitutional: Positive for activity change, appetite change, chills and fatigue. Negative for fever.   HENT: Positive for congestion, ear pain, postnasal drip, rhinorrhea and sore throat. Negative for ear discharge, sinus pressure, trouble swallowing and voice change.    Respiratory: Positive for cough. Negative for shortness of breath.    Cardiovascular: Negative for chest pain and palpitations.   Musculoskeletal: Positive for back pain and myalgias.   Neurological: Negative for headaches.   Psychiatric/Behavioral: Positive for sleep disturbance.     Objective:     Vitals:    01/11/22 0708   BP: 118/72   Pulse:  "76   Temp: 98.3 °F (36.8 °C)   Weight: 85.8 kg (189 lb 2.5 oz)   Height: 5' 8" (1.727 m)     Physical Exam  Vitals reviewed.   Constitutional:       Appearance: Normal appearance. She is well-developed and normal weight.   HENT:      Head: Normocephalic and atraumatic.      Right Ear: External ear normal. Tympanic membrane is erythematous.      Left Ear: External ear normal. Tympanic membrane is erythematous.      Nose: Mucosal edema and rhinorrhea present.      Right Turbinates: Swollen.      Left Turbinates: Swollen.      Right Sinus: Maxillary sinus tenderness and frontal sinus tenderness present.      Left Sinus: Maxillary sinus tenderness and frontal sinus tenderness present.      Mouth/Throat:      Lips: Pink.      Mouth: Mucous membranes are moist.      Pharynx: Oropharynx is clear. Uvula midline. Posterior oropharyngeal erythema present.      Tonsils: No tonsillar exudate.   Eyes:      Conjunctiva/sclera: Conjunctivae normal.   Cardiovascular:      Rate and Rhythm: Normal rate and regular rhythm.      Heart sounds: Normal heart sounds.   Pulmonary:      Effort: Pulmonary effort is normal.      Breath sounds: Normal breath sounds.   Musculoskeletal:      Cervical back: Normal range of motion and neck supple.   Neurological:      Mental Status: She is alert.   Psychiatric:         Speech: Speech normal.         Behavior: Behavior normal.       Assessment:     1. Sore throat    2. Cough    3. Acute nonintractable headache, unspecified headache type    4. Educated about COVID-19 virus infection      Plan:   Weston was seen today for sore throat.    Diagnoses and all orders for this visit:    Sore throat  Comments:  new day 4. covid tested poct molecular rapid negative today. push fluids. zpak today. flonase, sudafed, and tessalon pearles, and promdm, can do darinel tomorrow.  Orders:  -     POCT COVID-19 Rapid Screening; Future  -     POCT COVID-19 Rapid Screening    Cough  Comments:  new day 4. covid tested poct " molecular rapid negative today. push fluids. zpak today. flonase, sudafed, and tessalon pearles, and promdm, can do darinel tomorrow.    Acute nonintractable headache, unspecified headache type  Comments:  new day 4. covid tested poct molecular rapid negative today. push fluids. zpak today. flonase, sudafed, and tessalon pearles, and promdm, can do darinel tomorrow.    Educated about COVID-19 virus infection  Comments:  discussed CDC guidelines, work policies, and travel. written letter.     Other orders  -     fluticasone propionate (FLONASE) 50 mcg/actuation nasal spray; 1 spray (50 mcg total) by Each Nostril route once daily.  -     promethazine-dextromethorphan (PROMETHAZINE-DM) 6.25-15 mg/5 mL Syrp; Take 5 mLs by mouth every 4 (four) hours as needed (may make you drowsy).  -     Discontinue: benzonatate (TESSALON) 200 MG capsule; Take 1 capsule (200 mg total) by mouth 3 (three) times daily as needed for Cough.  -     guaiFENesin (MUCINEX) 1,200 mg Ta12; 1 tab po daily  -     pseudoephedrine (SUDAFED 12 HOUR) 120 mg 12 hr tablet; Take 1 tablet (120 mg total) by mouth 2 (two) times daily.  -     azithromycin (Z-CT) 250 MG tablet; Take 2 tablets by mouth on day 1; Take 1 tablet by mouth on days 2-5  -     benzonatate (TESSALON) 200 MG capsule; Take 1 capsule (200 mg total) by mouth 3 (three) times daily as needed for Cough.            Follow up if symptoms worsen or fail to improve.    Patient Instructions       Adult Self-Care for Colds  Colds are caused by viruses. They can't be cured with antibiotics. However, you can ease symptoms and support your body's efforts to heal itself.  No matter which symptoms you have, be sure to:  · Drink plenty of fluids (water or clear soup)  · Stop smoking and drinking alcohol  · Get plenty of rest    Understand a fever  · Take your temperature several times a day. If your fever is 100.4°F (38.0°C) for more than a day, call your healthcare provider.  · Relax, lie down. Go to bed if you  want. Just get off your feet and rest. Also, drink plenty of fluids to avoid dehydration.  · Take acetaminophen or a nonsteroidal anti-inflammatory agent (NSAID), such as ibuprofen.  Treat a troubled nose kindly  · Breathe steam or heated humidified air to open blocked nasal passages.  a hot shower or use a vaporizer. Be careful not to get burned by the steam.  · Saline nasal sprays and decongestant tablets help open a stuffy nose. Antihistamines can also help, but they can cause side effects such as drowsiness and drying of the eyes, nose, and mouth.  Soothe a sore throat and cough  · Gargle every 2 hours with 1/4 teaspoon of salt dissolved in 1/2 cup of warm water. Suck on throat lozenges and cough drops to moisten your throat.  · Cough medicines are available but it is unclear how well they actually work.  · Take acetaminophen or an NSAID, such as ibuprofen, to ease throat pain  Ease digestive problems  · Put fluids back into your body. Take frequent sips of clear liquids such as water or broth. Avoid drinks that have a lot of sugar in them, such as juices and sodas. These can make diarrhea worse. Older children and adults can drink sports drinks.  · As your appetite returns, you can resume your normal diet. Ask your healthcare provider if there are any foods you should avoid.  When to seek medical care  When you first notice symptoms, ask your healthcare provider if antiviral medicines are appropriate. Antibiotics should not be taken for colds or flu. Also, call your healthcare provider if you have any of the following symptoms or if you aren't feeling better after 7 days:  · Shortness of breath  · Pain or pressure in the chest or belly (abdomen)  · Worsening symptoms, especially after a period of improvement  · Fever of 100.4°F  (38.0°C) or higher, or fever that doesn't go down with medicine  · Sudden dizziness or confusion  · Severe or continued vomiting  · Signs of dehydration, including extreme  thirst, dark urine, infrequent urination, dry mouth  · Spotted, red, or very sore throat   Date Last Reviewed: 12/1/2016  © 8445-3459 [employer]. [address] [place] [address], [place] [address]. All rights reserved. This information is not intended as a substitute for professional medical care. Always follow your healthcare professional's instructions.

## 2022-01-11 NOTE — PATIENT INSTRUCTIONS

## 2022-01-18 ENCOUNTER — PATIENT MESSAGE (OUTPATIENT)
Dept: PRIMARY CARE CLINIC | Facility: CLINIC | Age: 27
End: 2022-01-18
Payer: COMMERCIAL

## 2022-01-18 DIAGNOSIS — M51.35 DDD (DEGENERATIVE DISC DISEASE), THORACOLUMBAR: Primary | ICD-10-CM

## 2022-02-22 NOTE — PROGRESS NOTES
New Patient Chronic Pain Note (Initial Visit)    Referring Physician: Madhuri Otto MD    PCP: Madhuri Otto MD    Chief Complaint:   Chief Complaint   Patient presents with    Low-back Pain     Going down both legs (midway)        SUBJECTIVE:    Weston Acuña is a 26 y.o. female with past medical history significant for anxiety who presents to the clinic for the evaluation of lower back and leg pain.  Patient reports pain began September 2021 with exercise.  Patient reports she was performing dead lifts and particularly back squats which began to cause strain in the lower back over the period of 1 month.  Today patient reports pain which is intermittent which is rated a 9/10.  Patient reports pain in a bandlike distribution in the lower back which radiates down the posterior aspect of bilateral lower extremities and L5-S1 distribution to the knees.  Pain is described as throbbing and aching in nature.  Pain is exacerbated with prolonged sitting, 1st thing in the morning as well as with lumbar flexion.  Pain is improved with standing and with motion as well as with extra-strength Tylenol.  Patient denies significant lower extremity weakness, bowel or bladder incontinence or saddle anesthesia.  Patient has noticed no improvement in her symptoms with initiation of Mobic or baclofen.  Patient has been performing physical therapy routinely which she believes has not improved or exacerbated her symptoms.    Patient denies night fever/night sweats, urinary incontinence, bowel incontinence, significant weight loss, significant motor weakness and loss of sensations.    Pain Disability Index Review:     Last 3 PDI Scores 2/24/2022   Pain Disability Index (PDI) 49       Non-Pharmacologic Treatments:  Physical Therapy/Home Exercise: yes  Ice/Heat:yes  TENS: no  Acupuncture: no  Massage: no  Chiropractic: no    Other: no      Pain Medications:  - Adjuvant Medications: Baclofen (Lioresal), Lexapro (Escitalopram) and  Mobic (Meloxicam) Buspar    Pain Procedures:   None    Past Medical History:   Diagnosis Date    Anxiety state 8/11/2020 1:47:30 PM    [facility]: Anxiety-No Additional Notes     Past Surgical History:   Procedure Laterality Date    APPENDECTOMY      NOSE SURGERY  2008 and 2015    x2    WISDOM TOOTH EXTRACTION       Review of patient's allergies indicates:  No Known Allergies    Current Outpatient Medications   Medication Sig    busPIRone (BUSPAR) 10 MG tablet Take 1 tablet (10 mg total) by mouth 2 (two) times daily.    EScitalopram oxalate (LEXAPRO) 10 MG tablet Take 1 tablet (10 mg total) by mouth once daily.    norgestrel-ethinyl estradioL (LO/OVRAL) 0.3-30 mg-mcg per tablet Take 1 tablet by mouth once daily.    baclofen (LIORESAL) 10 MG tablet Take 10 mg by mouth once daily.    fluticasone propionate (FLONASE) 50 mcg/actuation nasal spray 1 spray (50 mcg total) by Each Nostril route once daily.    guaiFENesin (MUCINEX) 1,200 mg Ta12 1 tab po daily    meloxicam (MOBIC) 7.5 MG tablet Take 7.5 mg by mouth 2 (two) times daily.    pseudoephedrine (SUDAFED 12 HOUR) 120 mg 12 hr tablet Take 1 tablet (120 mg total) by mouth 2 (two) times daily.    topiramate (TOPAMAX) 25 MG tablet Take 1 tablet (25 mg total) by mouth every evening for 7 days, THEN 1 tablet (25 mg total) 2 (two) times daily for 7 days, THEN 2 tablets (50 mg total) 2 (two) times daily for 7 days, THEN 3 tablets (75 mg total) 2 (two) times daily for 7 days, THEN 4 tablets (100 mg total) 2 (two) times daily for 7 days.     No current facility-administered medications for this visit.       Review of Systems     GENERAL:  No weight loss, malaise or fevers.  HEENT:   No recent changes in vision or hearing  NECK:  Negative for lumps, no difficulty with swallowing.  RESPIRATORY:  Negative for cough, wheezing or shortness of breath, patient denies any recent URI.  CARDIOVASCULAR:  Negative for chest pain, leg swelling or  palpitations.  GI:  Negative for abdominal discomfort, blood in stools or black stools or change in bowel habits.  MUSCULOSKELETAL:  See HPI.  SKIN:  Negative for lesions, rash, and itching.  PSYCH:  No mood disorder or recent psychosocial stressors.   HEMATOLOGY/LYMPHOLOGY:  Negative for prolonged bleeding, bruising easily or swollen nodes.    NEURO:   No history of headaches, syncope, paralysis, seizures or tremors.  All other reviewed and negative other than HPI.    OBJECTIVE:    /75   Pulse 66   Wt 85.1 kg (187 lb 9.8 oz)   BMI 28.53 kg/m²       Physical Exam    GENERAL: Well appearing, in no acute distress, alert and oriented x3.  PSYCH:  Mood and affect appropriate.  SKIN: Skin color, texture, turgor normal, no rashes or lesions.  HEAD/FACE:  Normocephalic, atraumatic. Cranial nerves grossly intact.    CV: RRR with palpation of the radial artery.  PULM: No evidence of respiratory difficulty, symmetric chest rise.  GI:  Soft and non-tender.    BACK: Straight leg raising in the sitting and supine positions is + to radicular pain b/l. No pain to palpation over the facet joints of the lumbar spine or spinous processes. Normal range of motion without pain reproduction.  EXTREMITIES: Peripheral joint ROM is full and pain free without obvious instability or laxity in all four extremities. No deformities, edema, or skin discoloration. Good capillary refill.  MUSCULOSKELETAL: Able to stand on heels & toes.    hip, and knee provocative maneuvers are negative.  There is no pain with palpation over the sacroiliac joints bilaterally.  FABERs test is negative.  Facet loading test is positive bilaterally.   Bilateral upper and lower extremity strength is normal and symmetric.  No atrophy or tone abnormalities are noted.  RIGHT Lower extremity: Hip flexion 5/5, Hip Abduction 5/5, Hip Adduction 5/5, Knee extension 5/5, Knee flexion 5/5, Ankle dorsiflexion5/5, Extensor hallucis longus 5/5, Ankle plantarflexion  5/5  LEFT Lower extremity:  Hip flexion 5/5, Hip Abduction 5/5,Hip Adduction 5/5, Knee extension 5/5, Knee flexion 5/5, Ankle dorsiflexion 5/5, Extensor hallucis longus 5/5, Ankle plantarflexion 5/5  -Normal testing knee (patellar) jerk and ankle (achilles) jerk    NEURO: Bilateral upper and lower extremity coordination and muscle stretch reflexes are physiologic and symmetric. No loss of sensation is noted.  GAIT: normal.    Imaging:   MRI lumbar spine 12/18/2021              ASSESSMENT: 26 y.o. year old female with lower back and leg pain, consistent with     1. Lumbar facet arthropathy     2. DDD (degenerative disc disease), thoracolumbar  Ambulatory referral/consult to Pain Clinic   3. Lumbar radiculopathy  EMG W/ ULTRASOUND AND NERVE CONDUCTION TEST 2 Extremities         PLAN:   - Interventions:  We have discussed considering a bilateral L5/S1 transforaminal epidural steroid injection to address radicular symptoms.  We will 1st review results of electromyography.  Explained the risks and benefits of the procedure in detail with the patient today in clinic along with alternative treatment options, and the patient elected to pursue the intervention at this time.      - Anticoagulation use: no no anticoagulation     report:  Reviewed and consistent with medication use as prescribed.    - Medications:  -We have discussed starting the patient on a Topamax titration.  We discussed potential side effects of this medication include drowsiness, dizziness, tingling of the hands and feet, diarrhea, dysgeusia, weight loss/anorexia.  Patient has been given the following algorithm to follow    Topamax Titration  Step 1: 25mg qHS for a week  Step 2: 25mg BID for a week  Step 3: 25mg in the AM, 50mg qHS for a week  Step 4: 50mg BID for a week  Step 5: 50mg in the AM, 75mg qHS for a week  Step 6: 75mg BID for a week  Step 7: 75mg in the AM, 100mg qHS for a week  Step 8: 100mg BID from then on    - Therapy:   We discussed  continuing physical therapy to help manage the patient/s painful condition. The patient was counseled that muscle strengthening will improve the long term prognosis in regards to pain and may also help increase range of motion and mobility. They were told that one of the goals of physical therapy is that they learn how to do the exercises so that they can do them independently at home daily upon completion. The patient's questions were answered and they were agreeable to this course.      - Imaging: Reviewed available imaging with patient and answered any questions they had regarding study.    - Consults/Referrals:  Physical Medicine Rehabilitation for lower extremity electromyography to help guide diagnosis and treatment.    - Records: Obtain old records from outside physicians and imaging:[facility]    - Follow up visit: return to clinic in 4-6 weeks    The above plan and management options were discussed at length with patient. Patient is in agreement with the above and verbalized understanding.    - I discussed the goals of interventional chronic pain management with the patient on today's visit. We discussed a multimodal and systematic approach to pain.  This includes diagnostic and therapeutic injections, adjuvant pharmacologic treatment, physical therapy, and at times psychiatry.  I emphasized the importance of regular exercise, core strengthening and stretching, diet and weight loss as a cornerstone of long-term pain management.    - This condition does not require this patient to take time off of work, and the primary goal of our Pain Management services is to improve the patient's functional capacity.  - Patient Questions: Answered all of the patient's questions regarding diagnoses, therapy, treatment and next steps        Saira Mohan MD  Interventional Pain Management  [facility]    Disclaimer:  This note was prepared using voice recognition system and is likely to have sound alike errors that may  have been overlooked even after proof reading.  Please call me with any questions

## 2022-02-24 ENCOUNTER — TELEPHONE (OUTPATIENT)
Dept: PAIN MEDICINE | Facility: CLINIC | Age: 27
End: 2022-02-24

## 2022-02-24 ENCOUNTER — OFFICE VISIT (OUTPATIENT)
Dept: PAIN MEDICINE | Facility: CLINIC | Age: 27
End: 2022-02-24
Payer: COMMERCIAL

## 2022-02-24 ENCOUNTER — TELEPHONE (OUTPATIENT)
Dept: PHYSICAL MEDICINE AND REHAB | Facility: CLINIC | Age: 27
End: 2022-02-24
Payer: COMMERCIAL

## 2022-02-24 VITALS
WEIGHT: 187.63 LBS | BODY MASS INDEX: 28.53 KG/M2 | DIASTOLIC BLOOD PRESSURE: 75 MMHG | SYSTOLIC BLOOD PRESSURE: 118 MMHG | HEART RATE: 66 BPM

## 2022-02-24 DIAGNOSIS — M47.816 LUMBAR FACET ARTHROPATHY: Primary | ICD-10-CM

## 2022-02-24 DIAGNOSIS — M51.35 DDD (DEGENERATIVE DISC DISEASE), THORACOLUMBAR: ICD-10-CM

## 2022-02-24 DIAGNOSIS — M54.16 LUMBAR RADICULOPATHY: ICD-10-CM

## 2022-02-24 PROCEDURE — 99999 PR PBB SHADOW E&M-EST. PATIENT-LVL IV: ICD-10-PCS | Mod: PBBFAC,,, | Performed by: ANESTHESIOLOGY

## 2022-02-24 PROCEDURE — 99204 PR OFFICE/OUTPT VISIT, NEW, LEVL IV, 45-59 MIN: ICD-10-PCS | Mod: S$GLB,,, | Performed by: ANESTHESIOLOGY

## 2022-02-24 PROCEDURE — 99999 PR PBB SHADOW E&M-EST. PATIENT-LVL IV: CPT | Mod: PBBFAC,,, | Performed by: ANESTHESIOLOGY

## 2022-02-24 PROCEDURE — 99204 OFFICE O/P NEW MOD 45 MIN: CPT | Mod: S$GLB,,, | Performed by: ANESTHESIOLOGY

## 2022-02-24 RX ORDER — TOPIRAMATE 25 MG/1
TABLET ORAL
Qty: 147 TABLET | Refills: 0 | Status: SHIPPED | OUTPATIENT
Start: 2022-02-24 | End: 2022-03-16 | Stop reason: SDUPTHER

## 2022-02-24 NOTE — PATIENT INSTRUCTIONS
General Neck and Back Pain    Both neck and back pain are usually caused by injury to the muscles or ligaments of the spine. Sometimes the disks that separate each bone of the spine may cause pain by pressing on a nearby nerve. Back and neck pain may appear after a sudden twisting or bending force (such as in a car accident), or sometimes after a simple awkward movement. In either case, muscle spasm is often present and adds to the pain.  Acute neck and back pain usually gets better in 1 to 2 weeks. Pain related to disk disease, arthritis in the spinal joints or spinal stenosis (narrowing of the spinal canal) can become chronic and last for months or years.  Back and neck pain are common problems. Most people feel better in 1 or 2 weeks, and most of the rest in 1 to 2 months. Most people can remain active.  People experience and describe pain differently.  Pain can be sharp, stabbing, shooting, aching, cramping, or burning  Movement, standing, bending, lifting, sitting, or walking may worsen the pain  Pain can be localized to one spot or area, or it can be more generalized  Pain can spread or radiate upwards, downwards, to the front, or go down your arms  Muscle spasm may occur.  Most of the time mechanical problems with the muscles or spine cause the pain. it is usually caused by an injury, whether known or not, to the muscles or ligaments. While illnesses can cause back pain, it is usually not caused by a serious illness. Pain is usually related to physical activity, whether sports, exercise, work, or normal activity. Sometimes it can occur without an identifiable cause. This can happen simply by stretching or moving wrong, without noting pain at the time. Other causes include:  Overexertion, lifting, pushing, pulling incorrectly or too aggressively.  Sudden twisting, bending or stretching from an accident (car or fall), or accidental movement.  Poor posture  Poor conditioning, lack of regular exercise  Spinal  disc disease or arthritis  Stress  Pregnancy, or illness like appendicitis, bladder or kidney infection, pelvic infections   Home care  For neck pain: Use a comfortable pillow that supports the head and keeps the spine in a neutral position. The position of the head should not be tilted forward or backward.  When in bed, try to find a position of comfort. A firm mattress is best. Try lying flat on your back with pillows under your knees. You can also try lying on your side with your knees bent up towards your chest and a pillow between your knees.  At first, do not try to stretch out the sore spots. If there is a strain, it is not like the good soreness you get after exercising without an injury. In this case, stretching may make it worse.  Avoid prolonged sitting, long car rides or travel. This puts more stress on the lower back than standing or walking.  During the first 24 to 72 hours after an injury, apply an ice pack to the painful area for 20 minutes and then remove it for 20 minutes over a period of 60 to 90 minutes or several times a day.   You can alternate ice and heat therapies. Talk with your healthcare provider about the best treatment for your back or neck pain. As a safety precaution, do not use a heating pad at bedtime. Sleeping with a heating pad can lead to skin burns or tissue damage.  Therapeutic massage can help relax the back and neck muscles without stretching them.  Be aware of safe lifting methods and do not lift anything over 15 pounds until all the pain is gone.  Medications  Talk to your healthcare provider before using medicine, especially if you have other medical problems or are taking other medicines.  You may use over-the-counter medicine to control pain, unless another pain medicine was prescribed. If you have chronic conditions like diabetes, liver or kidney disease, stomach ulcers,  gastrointestinal bleeding, or are taking blood thinner medicines.  Be careful if you are given pain  medicines, narcotics, or medicine for muscle spasm. They can cause drowsiness, and can affect your coordination, reflexes, and judgment. Do not drive or operate heavy machinery.  Follow-up care  Follow up with your healthcare provider, or as advised. Physical therapy or further tests may be needed.  If X-rays were taken, you will be notified of any new findings that may affect your care.  Call 911  Seek emergency medical care if any of the following occur:  Trouble breathing  Confusion  Very drowsy or trouble awakening  Fainting or loss of consciousness  Rapid or very slow heart rate  Loss of bowel or bladder control  When to seek medical advice  Call your healthcare provider right away if any of these occur:  Pain becomes worse or spreads into your arms or legs  Weakness, numbness or pain in one or both arms or legs  Numbness in the groin area  Difficulty walking  Fever of 100.4ºF (38ºC) or higher, or as directed by your healthcare provider  Date Last Reviewed: 7/1/2016  © 6698-6617 [employer]. [address] [place] [address]. All rights reserved. This information is not intended as a substitute for professional medical care. Always follow your healthcare professional's instructions.       Exercises to Strengthen Your Lower Back  Strong lower back and abdominal muscles work together to support your spine. The exercises below will help strengthen the lower back. It is important that you begin exercising slowly and increase levels gradually.  Always begin any exercise program with stretching. If you feel pain while doing any of these exercises, stop and talk to your doctor about a more specific exercise program that better suits your condition.   Low back stretch  The point of stretching is to make you more flexible and increase your range of motion. Stretch only as much as you are able. Stretch slowly. Do not push your stretch to the limit. If at any point you feel pain while stretching,  this is your (temporary) limit.  Lie on your back with your knees bent and both feet on the ground.  Slowly raise your left knee to your chest as you flatten your lower back against the floor. Hold for 5 seconds.  Relax and repeat the exercise with your right knee.  Do 10 of these exercises for each leg.  Repeat hugging both knees to your chest at the same time.  Building lower back strength  Start your exercise routine with 10 to 30 minutes a day, 1 to 3 times a day.  Initial exercises  Lying on your back:  Ankle pumps: Move your foot up and down, towards your head, and then away. Repeat 10 times with each foot.  Heel slides: Slowly bend your knee, drawing the heel of your foot towards you. Then slide your heel/foot from you, straightening your knee. Do not lift your foot off the floor (this is not a leg lift).  Abdominal contraction: Bend your knees and put your hands on your stomach. Tighten your stomach muscles. Hold for 5 seconds, then relax. Repeat 10 times.  Straight leg raise: Bend one leg at the knee and keep the other leg straight. Tighten your stomach muscles. Slowly lift your straight leg 6 to 12 inches off the floor and hold for up to 5 seconds. Repeat 10 times on each side.  Standing:  Wall squats: Stand with your back against the wall. Move your feet about 12 inches away from the wall. Tighten your stomach muscles, and slowly bend your knees until they are at about a 45 degree angle. Do not go down too far. Hold about 5 seconds. Then slowly return to your starting position. Repeat 10 times.  Heel raises: Stand facing the wall. Slowly raise the heels of your feet up and down, while keeping your toes on the floor. If you have trouble balancing, you can touch the wall with your hands. Repeat 10 times.  More advanced exercises  When you feel comfortable enough, try these exercises.  Kneeling lumbar extension: Begin on your hands and knees. At the same time, raise and straighten your right arm and left leg  until they are parallel to the ground. Hold for 2 seconds and come back slowly to a starting position. Repeat with left arm and right leg, alternating 10 times.  Prone lumbar extension: Lie face down, arms extended overhead, palms on the floor. At the same time, raise your right arm and left leg as high as comfortably possible. Hold for 10 seconds and slowly return to start. Repeat with left arm and right leg, alternating 10 times. Gradually build up to 20 times. (Advanced: Repeat this exercise raising both arms and both legs a few inches off the floor at the same time. Hold for 5 seconds and release.)  Pelvic tilt: Lie on the floor on your back with your knees bent at 90 degrees. Your feet should be flat on the floor. Inhale, exhale, then slowly contract your abdominal muscles bringing your navel toward your spine. Let your pelvis rock back until your lower back is flat on the floor. Hold for 10 seconds while breathing smoothly.  Abdominal crunch: Perform a pelvic tilt (above) flattening your lower back against the floor. Holding the tension in your abdominal muscles, take another breath and raise your shoulder blades off the ground (this is not a full sit-up). Keep your head in line with your body (dont bend your neck forward). Hold for 2 seconds, then slowly lower.  Date Last Reviewed: 6/1/2016  © 1042-6302 [employer]. [address] [place] [address], [place] [address]. All rights reserved. This information is not intended as a substitute for professional medical care. Always follow your healthcare professional's instructions.

## 2022-02-25 ENCOUNTER — OFFICE VISIT (OUTPATIENT)
Dept: URGENT CARE | Facility: CLINIC | Age: 27
End: 2022-02-25
Payer: COMMERCIAL

## 2022-02-25 ENCOUNTER — TELEPHONE (OUTPATIENT)
Dept: PAIN MEDICINE | Facility: CLINIC | Age: 27
End: 2022-02-25
Payer: COMMERCIAL

## 2022-02-25 VITALS
DIASTOLIC BLOOD PRESSURE: 74 MMHG | BODY MASS INDEX: 28.34 KG/M2 | WEIGHT: 187 LBS | OXYGEN SATURATION: 98 % | RESPIRATION RATE: 18 BRPM | SYSTOLIC BLOOD PRESSURE: 111 MMHG | HEIGHT: 68 IN | HEART RATE: 84 BPM | TEMPERATURE: 98 F

## 2022-02-25 DIAGNOSIS — M54.50 DORSALGIA OF LUMBOSACRAL REGION: Primary | ICD-10-CM

## 2022-02-25 DIAGNOSIS — M54.16 LUMBAR RADICULOPATHY: ICD-10-CM

## 2022-02-25 LAB
BILIRUB UR QL STRIP: NEGATIVE
GLUCOSE UR QL STRIP: NEGATIVE
KETONES UR QL STRIP: NEGATIVE
LEUKOCYTE ESTERASE UR QL STRIP: NEGATIVE
PH, POC UA: 8
POC BLOOD, URINE: NEGATIVE
POC NITRATES, URINE: NEGATIVE
PROT UR QL STRIP: NEGATIVE
SP GR UR STRIP: 1 (ref 1–1.03)
UROBILINOGEN UR STRIP-ACNC: NORMAL (ref 0.1–1.1)

## 2022-02-25 PROCEDURE — 81003 POCT URINALYSIS, DIPSTICK, AUTOMATED, W/O SCOPE: ICD-10-PCS | Mod: QW,S$GLB,, | Performed by: STUDENT IN AN ORGANIZED HEALTH CARE EDUCATION/TRAINING PROGRAM

## 2022-02-25 PROCEDURE — 96372 THER/PROPH/DIAG INJ SC/IM: CPT | Mod: S$GLB,,, | Performed by: STUDENT IN AN ORGANIZED HEALTH CARE EDUCATION/TRAINING PROGRAM

## 2022-02-25 PROCEDURE — 99213 PR OFFICE/OUTPT VISIT, EST, LEVL III, 20-29 MIN: ICD-10-PCS | Mod: 25,S$GLB,, | Performed by: STUDENT IN AN ORGANIZED HEALTH CARE EDUCATION/TRAINING PROGRAM

## 2022-02-25 PROCEDURE — 99213 OFFICE O/P EST LOW 20 MIN: CPT | Mod: 25,S$GLB,, | Performed by: STUDENT IN AN ORGANIZED HEALTH CARE EDUCATION/TRAINING PROGRAM

## 2022-02-25 PROCEDURE — 81003 URINALYSIS AUTO W/O SCOPE: CPT | Mod: QW,S$GLB,, | Performed by: STUDENT IN AN ORGANIZED HEALTH CARE EDUCATION/TRAINING PROGRAM

## 2022-02-25 PROCEDURE — 96372 PR INJECTION,THERAP/PROPH/DIAG2ST, IM OR SUBCUT: ICD-10-PCS | Mod: S$GLB,,, | Performed by: STUDENT IN AN ORGANIZED HEALTH CARE EDUCATION/TRAINING PROGRAM

## 2022-02-25 RX ORDER — DEXAMETHASONE SODIUM PHOSPHATE 100 MG/10ML
10 INJECTION INTRAMUSCULAR; INTRAVENOUS
Status: COMPLETED | OUTPATIENT
Start: 2022-02-25 | End: 2022-02-25

## 2022-02-25 RX ADMIN — DEXAMETHASONE SODIUM PHOSPHATE 10 MG: 100 INJECTION INTRAMUSCULAR; INTRAVENOUS at 05:02

## 2022-02-25 NOTE — TELEPHONE ENCOUNTER
----- Message from Calvin Holloway sent at 2/25/2022  1:01 PM CST -----  Pt would like return call, pt states she is experiencing more pain and weakness, would like advice.     Please call back at .[phone]. th Md

## 2022-02-25 NOTE — PROGRESS NOTES
"Subjective:       Patient ID: Weston Acuña is a 26 y.o. female.    Vitals:  height is 5' 8" (1.727 m) and weight is 84.8 kg (187 lb). Her temperature is 98.3 °F (36.8 °C). Her blood pressure is 111/74 and her pulse is 84. Her respiration is 18 and oxygen saturation is 98%.     Chief Complaint: Back Pain    History per patient and chart review for details/clarification.     Patient has had pain since end of novmeber but the last 3 days are unbearable. She has a torn disc in her back and lumbar radiculopathy and reports being in PT.  They changed her regimen 2 days ago and began focusing on more core strength. She has had increased pain since then. At baseline, she has bilateral radiation of pain into her thighs. However, she bent down earlier and had increased pain radiating down the right side and her pain has remained increased since then. Ms. Acuña has a pain management doctor and last saw her yesterday. She was started on Topamax and plan for bilateral transforaminal epidural steroid injection after the EMG that has been ordered.         Back Pain  This is a new problem. The current episode started in the past 7 days (2/22/22). The problem occurs constantly. The problem has been gradually worsening since onset. The quality of the pain is described as aching (throbbing). The pain radiates to the right thigh and left thigh. The pain is at a severity of 10/10. The pain is severe. The pain is the same all the time. The symptoms are aggravated by sitting. Stiffness is present all day. Associated symptoms include leg pain and weakness. Pertinent negatives include no abdominal pain, bladder incontinence, bowel incontinence, chest pain, dysuria, fever, headaches, numbness, paresis, paresthesias, pelvic pain, perianal numbness, tingling or weight loss. She has tried heat (baclofen,) for the symptoms. The treatment provided no relief.       Constitution: Negative for fever.   Cardiovascular: Negative for chest pain. "   Gastrointestinal: Negative for abdominal pain and bowel incontinence.   Genitourinary: Negative for dysuria, bladder incontinence and pelvic pain.   Musculoskeletal: Positive for back pain.   Neurological: Negative for headaches and numbness.       Objective:      Physical Exam   Constitutional:  Non-toxic appearance. She does not appear ill. No distress.   Eyes: Conjunctivae are normal.   Pulmonary/Chest: No respiratory distress.   Musculoskeletal:      Comments: Back exam deferred due to patient pain   Neurological: She is alert. Coordination and gait normal.   Psychiatric: Her behavior is normal.   Nursing note and vitals reviewed.        Assessment:       1. Dorsalgia of lumbosacral region    2. Lumbar radiculopathy          Plan:         Dorsalgia of lumbosacral region  -     POCT Urinalysis, Dipstick, Automated, W/O Scope  -     dexamethasone injection 10 mg    Lumbar radiculopathy  -     dexamethasone injection 10 mg    25 yo female with PMH DDD, lumbar radiculopathy, and lumbar facet arthropathy presents to  with acute on chronic back pain. No alarm features in history at this time. We discussed IM steroid to help alleviate acute pain and inflammation.  The patient will continue her Topamax as prescribed.  Indications for ED evaluation were reviewed.  The patient verbalized understanding and appear to be happy to proceed as outlined.  She will keep follow-up with her pain management doctor and PT as tolerated.

## 2022-02-25 NOTE — TELEPHONE ENCOUNTER
Called patient . Informed patient that she was seen yesterday and  wants her to start the topamax and tritiate it . Informed patient it can take a few weeks to start taking effect . Pt understood. All questions answered.   Alexis Boyd, MA [facility]

## 2022-02-27 ENCOUNTER — TELEPHONE (OUTPATIENT)
Dept: URGENT CARE | Facility: CLINIC | Age: 27
End: 2022-02-27
Payer: COMMERCIAL

## 2022-02-28 ENCOUNTER — TELEPHONE (OUTPATIENT)
Dept: URGENT CARE | Facility: CLINIC | Age: 27
End: 2022-02-28
Payer: COMMERCIAL

## 2022-03-16 ENCOUNTER — PATIENT MESSAGE (OUTPATIENT)
Dept: PAIN MEDICINE | Facility: CLINIC | Age: 27
End: 2022-03-16
Payer: COMMERCIAL

## 2022-03-16 RX ORDER — TOPIRAMATE 25 MG/1
TABLET ORAL
Qty: 226 TABLET | Refills: 0 | Status: SHIPPED | OUTPATIENT
Start: 2022-03-16 | End: 2022-05-10

## 2022-03-16 NOTE — TELEPHONE ENCOUNTER
Last visit:  2/24/22  Next visit:  5/5/22  Needing refill of Topamax 20 mg  Will start on 3 twice a day on tomorrow.

## 2022-03-24 ENCOUNTER — PATIENT MESSAGE (OUTPATIENT)
Dept: PRIMARY CARE CLINIC | Facility: CLINIC | Age: 27
End: 2022-03-24
Payer: COMMERCIAL

## 2022-03-28 ENCOUNTER — OFFICE VISIT (OUTPATIENT)
Dept: PHYSICAL MEDICINE AND REHAB | Facility: CLINIC | Age: 27
End: 2022-03-28
Payer: COMMERCIAL

## 2022-03-28 ENCOUNTER — PATIENT OUTREACH (OUTPATIENT)
Dept: ADMINISTRATIVE | Facility: OTHER | Age: 27
End: 2022-03-28
Payer: COMMERCIAL

## 2022-03-28 VITALS
RESPIRATION RATE: 14 BRPM | BODY MASS INDEX: 28.34 KG/M2 | SYSTOLIC BLOOD PRESSURE: 119 MMHG | HEART RATE: 72 BPM | WEIGHT: 187 LBS | DIASTOLIC BLOOD PRESSURE: 83 MMHG | HEIGHT: 68 IN

## 2022-03-28 DIAGNOSIS — G57.00 PIRIFORMIS SYNDROME, UNSPECIFIED LATERALITY: ICD-10-CM

## 2022-03-28 PROCEDURE — 99203 OFFICE O/P NEW LOW 30 MIN: CPT | Mod: 25,S$GLB,, | Performed by: PHYSICAL MEDICINE & REHABILITATION

## 2022-03-28 PROCEDURE — 95886 PR EMG COMPLETE, W/ NERVE CONDUCTION STUDIES, 5+ MUSCLES: ICD-10-PCS | Mod: S$GLB,,, | Performed by: PHYSICAL MEDICINE & REHABILITATION

## 2022-03-28 PROCEDURE — 95908 PR NERVE CONDUCTION STUDY; 3-4 STUDIES: ICD-10-PCS | Mod: S$GLB,,, | Performed by: PHYSICAL MEDICINE & REHABILITATION

## 2022-03-28 PROCEDURE — 95908 NRV CNDJ TST 3-4 STUDIES: CPT | Mod: S$GLB,,, | Performed by: PHYSICAL MEDICINE & REHABILITATION

## 2022-03-28 PROCEDURE — 95886 MUSC TEST DONE W/N TEST COMP: CPT | Mod: S$GLB,,, | Performed by: PHYSICAL MEDICINE & REHABILITATION

## 2022-03-28 PROCEDURE — 99999 PR PBB SHADOW E&M-EST. PATIENT-LVL III: CPT | Mod: PBBFAC,,, | Performed by: PHYSICAL MEDICINE & REHABILITATION

## 2022-03-28 PROCEDURE — 99203 PR OFFICE/OUTPT VISIT, NEW, LEVL III, 30-44 MIN: ICD-10-PCS | Mod: 25,S$GLB,, | Performed by: PHYSICAL MEDICINE & REHABILITATION

## 2022-03-28 PROCEDURE — 99999 PR PBB SHADOW E&M-EST. PATIENT-LVL III: ICD-10-PCS | Mod: PBBFAC,,, | Performed by: PHYSICAL MEDICINE & REHABILITATION

## 2022-03-28 NOTE — PROGRESS NOTES
[facility]   [address] [facility] [facility]  [place] [address]  Phone: [phone]        Full Name: Weston Acuña YOB: 1995  Patient ID: [id number]      Visit Date: 3/28/2022 14:30  Age: 26 Years 7 Months Old  Examining Physician: Claudia Manzo M.D.  Referring Physician: Dr Mohan  Reason for Referral: lower ext pain      Chief Complaint   Patient presents with    Back Pain     Into leg       HPI: This is a 26 y.o.  female being seen in clinic today for evaluation of acute on chronic low back achy pain with radiation of symptoms into her post-lateral legs.  Her symptoms have improved recently with piriformis and gluteus medius stretches and starting topimax from Dr Mohan.  She reports symptom -free today    History obtained from patient    Past family, medical, social, and surgical history reviewed in chart    Review of Systems:     General- denies lethargy, weight change, fever, chills  Head/neck- denies swallowing difficulties  ENT- denies hearing changes  Cardiovascular-denies chest pain  Pulmonary- denies shortness of breath  GI- denies constipation or bowel incontinence  - denies bladder incontinence  Skin- denies wounds or rashes  Musculoskeletal- denies weakness, +/-pain  Neurologic- +/-numbness and tingling  Psychiatric- denies depressive or psychotic features, denies anxiety  Lymphatic-denies swelling  Endocrine- denies hypoglycemic symptoms/DM history  All other pertinent systems negative     Physical Examination:  General: Well developed, well nourished female, NAD  HEENT:NCAT EOMI bilaterally   Pulmonary:Normal respirations    Spinal Examination: CERVICAL  Active ROM is within normal limits.  Inspection: No deformity of spinal alignment.    Spinal Examination: LUMBAR or THORACIC  Active ROM is within normal limits.  Inspection: No deformity of spinal alignment.    Palpation: No vertebral tenderness to percussion.  Mild ttp at right GT bursa, VERY ttp at right piriformis with mild  reproduction of symptoms, ttp at left piriformis    Bilateral Upper and Lower Extremities:  Pulses are 2+ at radial bilaterally.  Shoulder/Elbow/Wrist/Hand ROM   Hip/Knee/Ankle ROM wnl  Bilateral Extremities show normal capillary refill.  No signs of cyanosis, rubor, edema, skin changes, or dysvascular changes of appendages.  Nails appear intact.    Neurological Exam:  Cranial Nerves:  II-XII grossly intact    Manual Muscle Testing: (Motor 5=normal)  5/5 strength bilateral lower extremities    No focal atrophy is noted of either upper or lower extremity.    Bilateral Reflexes:  No clonus at knee or ankle.    Sensation: tested to light touch  - intact in legs  Gait: Narrow base and good arm swing.      Entire procedure explained to patient prior to proceeding.  Verbal consent obtained    SNC      Nerve / Sites Rec. Site Onset Lat Peak Lat Amp Segments Distance Velocity     ms ms µV  mm m/s   R Sural - Ankle (Calf)      Calf Ankle 2.6 3.3 15.4 Calf - Ankle 140 55   R Superficial peroneal - Ankle      1  1.7 2.1 5.0 1 - G1 140 81       MNC      Nerve / Sites Muscle Latency Amplitude Duration Rel Amp Segments Distance Lat Diff Velocity     ms mV ms %  mm ms m/s   R Peroneal - EDB      Ankle EDB 4.0 5.8 5.2 100 Ankle - EDB 80        Fib head EDB 10.1 5.6 5.8 97.1 Fib head - Ankle 315 6.1 51      Pop fossa EDB 11.6 5.9 5.9 104 Pop fossa - Fib head 80 1.5 55         Pop fossa - Ankle  7.6    R Tibial - AH      Ankle AH 2.8 11.7 5.6 100 Ankle - AH 80        Pop fossa AH 13.6 8.0 4.4 68.1 Pop fossa - Ankle 440 10.9 40       EMG            EMG Summary Table     Spontaneous MUAP Recruitment   Muscle IA Fib PSW Fasc Other Dur. Dur Amp Dur Polys Pattern Effort   R. Rectus femoris N None None None .   N N N N Max   R. Vastus lateralis N None None None .   N N N N Max   R. Biceps femoris (short head) N None None None .   N N N N Max   R. Tibialis anterior N None None None .   N N N N Max   R. Gastrocnemius (Medial head) N None  None None .   N N N N Max   R. Extensor digitorum brevis N None None None .   N N N N Max   R. Abductor hallucis N None None None .   N N N N Max                        INTERPRETATION  -Right superficial peroneal sensory nerve conduction study showed normal peak latency and amplitude  -Right sural sensory nerve conduction study showed normal peak latency and amplitude  -Right peroneal motor nerve conduction study showed normal latency, amplitude, and conduction velocity  -Right tibial motor nerve conduction study showed normal latency, amplitude, and conduction velocity  -Needle EMG examination performed to above mentioned muscles       IMPRESSION  1. ABNORMAL study  2. There is NO electrodiagnostic evidence of a peroneal, tibial, superficial peroneal, or sural neuropathy of the right lower extremity. There was no evidence of a lumbar radiculopathy of the L2-S1 nerve roots  3. There is clinical evidence of piriformis syndrome    PLAN  Discussed in detail for greater than 30 minutes about diagnosis and treatment plan    1. Follow up with referring provider: Dr. Saira Mohan  2. Handouts on core and hip strengthening, piriformis stretches provided. Cont PT with core strengthening, piriformis modalities/stretches, dry needle. Consider piriformis injections if needed  3. This study is good for one year. If symptoms worsen or do not improve, please re-consult.    Claudia Manzo M.D.  Physical Medicine and Rehab

## 2022-03-29 ENCOUNTER — PATIENT MESSAGE (OUTPATIENT)
Dept: RESEARCH | Facility: HOSPITAL | Age: 27
End: 2022-03-29
Payer: COMMERCIAL

## 2022-03-31 ENCOUNTER — PATIENT MESSAGE (OUTPATIENT)
Dept: PHYSICAL MEDICINE AND REHAB | Facility: CLINIC | Age: 27
End: 2022-03-31
Payer: COMMERCIAL

## 2022-05-04 ENCOUNTER — TELEPHONE (OUTPATIENT)
Dept: PAIN MEDICINE | Facility: CLINIC | Age: 27
End: 2022-05-04
Payer: COMMERCIAL

## 2022-05-04 NOTE — PROGRESS NOTES
Established Patient Chronic Pain Note   Referring Physician: No ref. provider found    PCP: Madhuri Otto MD    Chief Complaint:   Chief Complaint   Patient presents with    EMG      Follow-up for EMG        SUBJECTIVE:  Interval history 05/05/2022  Patient presents for EMG review    EMG 03/28/2022  IMPRESSION  1. ABNORMAL study  2. There is NO electrodiagnostic evidence of a peroneal, tibial, superficial peroneal, or sural neuropathy of the right lower extremity. There was no evidence of a lumbar radiculopathy of the L2-S1 nerve roots  3. There is clinical evidence of piriformis syndrome    Patient reports 0/10 pain today.  She reports significant improvement in lower back and buttock pain since our last clinic visit.  Patient reports she is discontinued Topamax secondary to sensation of paresthesias in bilateral hands.  Patient also reports with significant improvement in her symptoms, lack of necessity of her medication.  Patient denies any significant lower extremity radiculopathy, bowel or bladder incontinence or saddle anesthesia.  Patient completed 3 months of conventional physical therapy and discontinued last month.    HPI 02/24/2022  Weston Acuña is a 26 y.o. female with past medical history significant for anxiety who presents to the clinic for the evaluation of lower back and leg pain.  Patient reports pain began September 2021 with exercise.  Patient reports she was performing dead lifts and particularly back squats which began to cause strain in the lower back over the period of 1 month.  Today patient reports pain which is intermittent which is rated a 9/10.  Patient reports pain in a bandlike distribution in the lower back which radiates down the posterior aspect of bilateral lower extremities and L5-S1 distribution to the knees.  Pain is described as throbbing and aching in nature.  Pain is exacerbated with prolonged sitting, 1st thing in the morning as well as with lumbar flexion.  Pain is  improved with standing and with motion as well as with extra-strength Tylenol.  Patient denies significant lower extremity weakness, bowel or bladder incontinence or saddle anesthesia.  Patient has noticed no improvement in her symptoms with initiation of Mobic or baclofen.  Patient has been performing physical therapy routinely which she believes has not improved or exacerbated her symptoms.    Patient denies night fever/night sweats, urinary incontinence, bowel incontinence, significant weight loss, significant motor weakness and loss of sensations.    Pain Disability Index Review:     Last 3 PDI Scores 2/24/2022   Pain Disability Index (PDI) 49       Non-Pharmacologic Treatments:  Physical Therapy/Home Exercise: yes  Ice/Heat:yes  TENS: no  Acupuncture: no  Massage: no  Chiropractic: no    Other: no      Pain Medications:  - Adjuvant Medications: Baclofen (Lioresal), Lexapro (Escitalopram) and Mobic (Meloxicam) Buspar    Pain Procedures:   None    Past Medical History:   Diagnosis Date    Anxiety state 8/11/2020 1:47:30 PM    [facility] Historical - HA: Anxiety-No Additional Notes     Past Surgical History:   Procedure Laterality Date    APPENDECTOMY      NOSE SURGERY  2008 and 2015    x2    WISDOM TOOTH EXTRACTION       Review of patient's allergies indicates:  No Known Allergies    Current Outpatient Medications   Medication Sig    busPIRone (BUSPAR) 10 MG tablet Take 1 tablet (10 mg total) by mouth 2 (two) times daily.    EScitalopram oxalate (LEXAPRO) 10 MG tablet Take 1 tablet (10 mg total) by mouth once daily.    norgestrel-ethinyl estradioL (LO/OVRAL) 0.3-30 mg-mcg per tablet Take 1 tablet by mouth once daily.    baclofen (LIORESAL) 10 MG tablet Take 10 mg by mouth once daily.    pseudoephedrine (SUDAFED 12 HOUR) 120 mg 12 hr tablet Take 1 tablet (120 mg total) by mouth 2 (two) times daily. (Patient not taking: Reported on 2/25/2022)    topiramate (TOPAMAX) 25 MG tablet Take 3 tablets (75 mg  "total) by mouth 2 (two) times daily for 7 days, THEN 4 tablets (100 mg total) 2 (two) times daily for 23 days.     No current facility-administered medications for this visit.       Review of Systems     GENERAL:  No weight loss, malaise or fevers.  HEENT:   No recent changes in vision or hearing  NECK:  Negative for lumps, no difficulty with swallowing.  RESPIRATORY:  Negative for cough, wheezing or shortness of breath, patient denies any recent URI.  CARDIOVASCULAR:  Negative for chest pain, leg swelling or palpitations.  GI:  Negative for abdominal discomfort, blood in stools or black stools or change in bowel habits.  MUSCULOSKELETAL:  See HPI.  SKIN:  Negative for lesions, rash, and itching.  PSYCH:  No mood disorder or recent psychosocial stressors.   HEMATOLOGY/LYMPHOLOGY:  Negative for prolonged bleeding, bruising easily or swollen nodes.    NEURO:   No history of headaches, syncope, paralysis, seizures or tremors.  All other reviewed and negative other than HPI.    OBJECTIVE:    /75   Pulse 67   Resp 17   Ht 5' 8" (1.727 m)   Wt 83.6 kg (184 lb 4.9 oz)   LMP 05/03/2022   BMI 28.02 kg/m²       Physical Exam    GENERAL: Well appearing, in no acute distress, alert and oriented x3.  PSYCH:  Mood and affect appropriate.  SKIN: Skin color, texture, turgor normal, no rashes or lesions.  HEAD/FACE:  Normocephalic, atraumatic. Cranial nerves grossly intact.    CV: RRR with palpation of the radial artery.  PULM: No evidence of respiratory difficulty, symmetric chest rise.  GI:  Soft and non-tender.    BACK: Straight leg raising in the sitting and supine positions is + to radicular pain b/l. No pain to palpation over the facet joints of the lumbar spine or spinous processes. Normal range of motion without pain reproduction.  EXTREMITIES: Peripheral joint ROM is full and pain free without obvious instability or laxity in all four extremities. No deformities, edema, or skin discoloration. Good capillary " refill.  MUSCULOSKELETAL: Able to stand on heels & toes.    hip, and knee provocative maneuvers are negative.  There is no pain with palpation over the sacroiliac joints bilaterally.  FABERs test is negative.  Facet loading test is positive bilaterally.   Bilateral upper and lower extremity strength is normal and symmetric.  No atrophy or tone abnormalities are noted.  RIGHT Lower extremity: Hip flexion 5/5, Hip Abduction 5/5, Hip Adduction 5/5, Knee extension 5/5, Knee flexion 5/5, Ankle dorsiflexion5/5, Extensor hallucis longus 5/5, Ankle plantarflexion 5/5  LEFT Lower extremity:  Hip flexion 5/5, Hip Abduction 5/5,Hip Adduction 5/5, Knee extension 5/5, Knee flexion 5/5, Ankle dorsiflexion 5/5, Extensor hallucis longus 5/5, Ankle plantarflexion 5/5  -Normal testing knee (patellar) jerk and ankle (achilles) jerk    NEURO: Bilateral upper and lower extremity coordination and muscle stretch reflexes are physiologic and symmetric. No loss of sensation is noted.  GAIT: normal.    Imaging:   MRI lumbar spine 12/18/2021              ASSESSMENT: 26 y.o. year old female with lower back and leg pain, consistent with     1. DDD (degenerative disc disease), thoracolumbar     2. Lumbar facet arthropathy     3. Piriformis syndrome, unspecified laterality           PLAN:   - Interventions:  We have discussed considering a bilateral piriformis injection under fluoroscopic guidance should symptoms exacerbate.  Explained the risks and benefits of the procedure in detail with the patient today in clinic along with alternative treatment options    - Anticoagulation use: no no anticoagulation     report:  Reviewed and consistent with medication use as prescribed.    - Medications:  -discontinue neuropathic pain medications secondary to no acute or chronic radiculopathy demonstrated on electromyography.  We have discussed considering an antispasmodic in the future for myofascial pain.    - Therapy:   We discussed continuing physical  therapy exercises at home to help manage the patient/s painful condition. The patient was counseled that muscle strengthening will improve the long term prognosis in regards to pain and may also help increase range of motion and mobility.     - Imaging: Reviewed available imaging with patient and answered any questions they had regarding study.    - Consults/Referrals:reviewed: lower extremity electromyography     - Records: Obtain old records from outside physicians and imaging:[facility]    - Follow up visit: return to clinic in 3 mo    The above plan and management options were discussed at length with patient. Patient is in agreement with the above and verbalized understanding.    - I discussed the goals of interventional chronic pain management with the patient on today's visit. We discussed a multimodal and systematic approach to pain.  This includes diagnostic and therapeutic injections, adjuvant pharmacologic treatment, physical therapy, and at times psychiatry.  I emphasized the importance of regular exercise, core strengthening and stretching, diet and weight loss as a cornerstone of long-term pain management.    - This condition does not require this patient to take time off of work, and the primary goal of our Pain Management services is to improve the patient's functional capacity.  - Patient Questions: Answered all of the patient's questions regarding diagnoses, therapy, treatment and next steps        Saira Mohan MD  Interventional Pain Management  [facility]    Disclaimer:  This note was prepared using voice recognition system and is likely to have sound alike errors that may have been overlooked even after proof reading.  Please call me with any questions

## 2022-05-05 ENCOUNTER — OFFICE VISIT (OUTPATIENT)
Dept: PAIN MEDICINE | Facility: CLINIC | Age: 27
End: 2022-05-05
Payer: COMMERCIAL

## 2022-05-05 VITALS
HEIGHT: 68 IN | BODY MASS INDEX: 27.93 KG/M2 | RESPIRATION RATE: 17 BRPM | WEIGHT: 184.31 LBS | DIASTOLIC BLOOD PRESSURE: 75 MMHG | HEART RATE: 67 BPM | SYSTOLIC BLOOD PRESSURE: 105 MMHG

## 2022-05-05 DIAGNOSIS — G57.00 PIRIFORMIS SYNDROME, UNSPECIFIED LATERALITY: ICD-10-CM

## 2022-05-05 DIAGNOSIS — M47.816 LUMBAR FACET ARTHROPATHY: ICD-10-CM

## 2022-05-05 DIAGNOSIS — M51.35 DDD (DEGENERATIVE DISC DISEASE), THORACOLUMBAR: Primary | ICD-10-CM

## 2022-05-05 PROCEDURE — 99214 PR OFFICE/OUTPT VISIT, EST, LEVL IV, 30-39 MIN: ICD-10-PCS | Mod: S$GLB,,, | Performed by: ANESTHESIOLOGY

## 2022-05-05 PROCEDURE — 99999 PR PBB SHADOW E&M-EST. PATIENT-LVL III: CPT | Mod: PBBFAC,,, | Performed by: ANESTHESIOLOGY

## 2022-05-05 PROCEDURE — 99214 OFFICE O/P EST MOD 30 MIN: CPT | Mod: S$GLB,,, | Performed by: ANESTHESIOLOGY

## 2022-05-05 PROCEDURE — 99999 PR PBB SHADOW E&M-EST. PATIENT-LVL III: ICD-10-PCS | Mod: PBBFAC,,, | Performed by: ANESTHESIOLOGY

## 2022-05-05 NOTE — PATIENT INSTRUCTIONS
General Neck and Back Pain    Both neck and back pain are usually caused by injury to the muscles or ligaments of the spine. Sometimes the disks that separate each bone of the spine may cause pain by pressing on a nearby nerve. Back and neck pain may appear after a sudden twisting or bending force (such as in a car accident), or sometimes after a simple awkward movement. In either case, muscle spasm is often present and adds to the pain.  Acute neck and back pain usually gets better in 1 to 2 weeks. Pain related to disk disease, arthritis in the spinal joints or spinal stenosis (narrowing of the spinal canal) can become chronic and last for months or years.  Back and neck pain are common problems. Most people feel better in 1 or 2 weeks, and most of the rest in 1 to 2 months. Most people can remain active.  People experience and describe pain differently.  Pain can be sharp, stabbing, shooting, aching, cramping, or burning  Movement, standing, bending, lifting, sitting, or walking may worsen the pain  Pain can be localized to one spot or area, or it can be more generalized  Pain can spread or radiate upwards, downwards, to the front, or go down your arms  Muscle spasm may occur.  Most of the time mechanical problems with the muscles or spine cause the pain. it is usually caused by an injury, whether known or not, to the muscles or ligaments. While illnesses can cause back pain, it is usually not caused by a serious illness. Pain is usually related to physical activity, whether sports, exercise, work, or normal activity. Sometimes it can occur without an identifiable cause. This can happen simply by stretching or moving wrong, without noting pain at the time. Other causes include:  Overexertion, lifting, pushing, pulling incorrectly or too aggressively.  Sudden twisting, bending or stretching from an accident (car or fall), or accidental movement.  Poor posture  Poor conditioning, lack of regular exercise  Spinal  disc disease or arthritis  Stress  Pregnancy, or illness like appendicitis, bladder or kidney infection, pelvic infections   Home care  For neck pain: Use a comfortable pillow that supports the head and keeps the spine in a neutral position. The position of the head should not be tilted forward or backward.  When in bed, try to find a position of comfort. A firm mattress is best. Try lying flat on your back with pillows under your knees. You can also try lying on your side with your knees bent up towards your chest and a pillow between your knees.  At first, do not try to stretch out the sore spots. If there is a strain, it is not like the good soreness you get after exercising without an injury. In this case, stretching may make it worse.  Avoid prolonged sitting, long car rides or travel. This puts more stress on the lower back than standing or walking.  During the first 24 to 72 hours after an injury, apply an ice pack to the painful area for 20 minutes and then remove it for 20 minutes over a period of 60 to 90 minutes or several times a day.   You can alternate ice and heat therapies. Talk with your healthcare provider about the best treatment for your back or neck pain. As a safety precaution, do not use a heating pad at bedtime. Sleeping with a heating pad can lead to skin burns or tissue damage.  Therapeutic massage can help relax the back and neck muscles without stretching them.  Be aware of safe lifting methods and do not lift anything over 15 pounds until all the pain is gone.  Medications  Talk to your healthcare provider before using medicine, especially if you have other medical problems or are taking other medicines.  You may use over-the-counter medicine to control pain, unless another pain medicine was prescribed. If you have chronic conditions like diabetes, liver or kidney disease, stomach ulcers,  gastrointestinal bleeding, or are taking blood thinner medicines.  Be careful if you are given pain  medicines, narcotics, or medicine for muscle spasm. They can cause drowsiness, and can affect your coordination, reflexes, and judgment. Do not drive or operate heavy machinery.  Follow-up care  Follow up with your healthcare provider, or as advised. Physical therapy or further tests may be needed.  If X-rays were taken, you will be notified of any new findings that may affect your care.  Call 911  Seek emergency medical care if any of the following occur:  Trouble breathing  Confusion  Very drowsy or trouble awakening  Fainting or loss of consciousness  Rapid or very slow heart rate  Loss of bowel or bladder control  When to seek medical advice  Call your healthcare provider right away if any of these occur:  Pain becomes worse or spreads into your arms or legs  Weakness, numbness or pain in one or both arms or legs  Numbness in the groin area  Difficulty walking  Fever of 100.4ºF (38ºC) or higher, or as directed by your healthcare provider  Date Last Reviewed: 7/1/2016  © 3055-5178 [employer]. [address] [place] [address]. All rights reserved. This information is not intended as a substitute for professional medical care. Always follow your healthcare professional's instructions.       Understanding Lumbar Radiculopathy    Lumbar radiculopathy is irritation or inflammation of a nerve root in the low back. It causes symptoms that spread out from the back down one or both legs. To understand this condition, it helps to understand the parts of the spine:  Vertebrae. These are bones that stack to form the spine. The lumbar spine contains the 5 bottom vertebrae.  Disks. These are soft pads of tissue between the vertebrae. They act as shock absorbers for the spine.  Spinal canal. This is a tunnel formed within the stacked vertebrae. In the lumbar spine, nerves run through this canal.  Nerves. These branch off and leave the spinal canal, traveling out to parts of the body. As they leave the  spinal canal, nerves pass through openings between the vertebrae. The nerve root is the part of the nerve that is closest to the spinal canal.  Sciatic nerve. This is a large nerve formed from several nerve roots in the low back. This nerve extends down the back of the leg to the foot.  With lumbar radiculopathy, nerve roots in the low back become irritated. This leads to pain and symptoms. The sciatic nerve is commonly involved, so the condition is often called sciatica.  What causes lumbar radiculopathy?  Aging, injury, poor posture, extra body weight, and other issues can lead to problems in the low back. These problems may then irritate nerve roots. They include:  Damage to a disk in the lumbar spine. The damaged disk may then press on nearby nerve roots.  Degeneration from wear and tear, and aging. This can lead to narrowing (stenosis) of the openings between the vertebrae. The narrowed openings press on nerve roots as they leave the spinal canal.  Unstable spine. This is when a vertebra slips forward. It can then press on a nerve root.  Other, less common things can put pressure on nerves in the low back. These include diabetes, infection, or a tumor.  Symptoms of lumbar radiculopathy  These include:  Pain in the low back  Pain, numbness, tingling, or weakness that travels into the buttocks, hip, groin, or leg  Muscle spasms  Treatment for lumbar radiculopathy  In most cases, your healthcare provider will first try treatments that help relieve symptoms. These may include:  Prescription and over-the-counter pain medicines. These help relieve pain, swelling, and irritation.  Limits on positions and activities that increase pain. But lying in bed or avoiding all movement is only recommended for a short period of time.  Physical therapy, including exercises and stretches. This helps decrease pain and increase movement and function.  Steroid shots into the lower back. This may help relieve symptoms for a  time.  Weight-loss program. If you are overweight, losing extra pounds may help relieve symptoms.  In some cases, you may need surgery to fix the underlying problem. This depends on the cause, the symptoms, and how long the pain has lasted.  Possible complications  Over time, an irritated and inflamed nerve may become damaged. This may lead to long-lasting (permanent) numbness or weakness in your legs and feet. If symptoms change suddenly or get worse, be sure to let your healthcare provider know.  When to call your healthcare provider  Call your healthcare provider right away if you have any of these:  New pain or pain that gets worse  New or increasing weakness, tingling, or numbness in your leg or foot  Problems controlling your bladder or bowel   Date Last Reviewed: 3/10/2016  © 3452-8813 [employer]. [address] [place] [address], [place] [address]. All rights reserved. This information is not intended as a substitute for professional medical care. Always follow your healthcare professional's instructions.       Exercises to Strengthen Your Lower Back  Strong lower back and abdominal muscles work together to support your spine. The exercises below will help strengthen the lower back. It is important that you begin exercising slowly and increase levels gradually.  Always begin any exercise program with stretching. If you feel pain while doing any of these exercises, stop and talk to your doctor about a more specific exercise program that better suits your condition.   Low back stretch  The point of stretching is to make you more flexible and increase your range of motion. Stretch only as much as you are able. Stretch slowly. Do not push your stretch to the limit. If at any point you feel pain while stretching, this is your (temporary) limit.  Lie on your back with your knees bent and both feet on the ground.  Slowly raise your left knee to your chest as you flatten your lower back against the floor. Hold  for 5 seconds.  Relax and repeat the exercise with your right knee.  Do 10 of these exercises for each leg.  Repeat hugging both knees to your chest at the same time.  Building lower back strength  Start your exercise routine with 10 to 30 minutes a day, 1 to 3 times a day.  Initial exercises  Lying on your back:  Ankle pumps: Move your foot up and down, towards your head, and then away. Repeat 10 times with each foot.  Heel slides: Slowly bend your knee, drawing the heel of your foot towards you. Then slide your heel/foot from you, straightening your knee. Do not lift your foot off the floor (this is not a leg lift).  Abdominal contraction: Bend your knees and put your hands on your stomach. Tighten your stomach muscles. Hold for 5 seconds, then relax. Repeat 10 times.  Straight leg raise: Bend one leg at the knee and keep the other leg straight. Tighten your stomach muscles. Slowly lift your straight leg 6 to 12 inches off the floor and hold for up to 5 seconds. Repeat 10 times on each side.  Standing:  Wall squats: Stand with your back against the wall. Move your feet about 12 inches away from the wall. Tighten your stomach muscles, and slowly bend your knees until they are at about a 45 degree angle. Do not go down too far. Hold about 5 seconds. Then slowly return to your starting position. Repeat 10 times.  Heel raises: Stand facing the wall. Slowly raise the heels of your feet up and down, while keeping your toes on the floor. If you have trouble balancing, you can touch the wall with your hands. Repeat 10 times.  More advanced exercises  When you feel comfortable enough, try these exercises.  Kneeling lumbar extension: Begin on your hands and knees. At the same time, raise and straighten your right arm and left leg until they are parallel to the ground. Hold for 2 seconds and come back slowly to a starting position. Repeat with left arm and right leg, alternating 10 times.  Prone lumbar extension: Lie face  down, arms extended overhead, palms on the floor. At the same time, raise your right arm and left leg as high as comfortably possible. Hold for 10 seconds and slowly return to start. Repeat with left arm and right leg, alternating 10 times. Gradually build up to 20 times. (Advanced: Repeat this exercise raising both arms and both legs a few inches off the floor at the same time. Hold for 5 seconds and release.)  Pelvic tilt: Lie on the floor on your back with your knees bent at 90 degrees. Your feet should be flat on the floor. Inhale, exhale, then slowly contract your abdominal muscles bringing your navel toward your spine. Let your pelvis rock back until your lower back is flat on the floor. Hold for 10 seconds while breathing smoothly.  Abdominal crunch: Perform a pelvic tilt (above) flattening your lower back against the floor. Holding the tension in your abdominal muscles, take another breath and raise your shoulder blades off the ground (this is not a full sit-up). Keep your head in line with your body (dont bend your neck forward). Hold for 2 seconds, then slowly lower.  Date Last Reviewed: 6/1/2016  © 4835-1074 [employer]. [address] [place] [address], [place] [address]. All rights reserved. This information is not intended as a substitute for professional medical care. Always follow your healthcare professional's instructions.

## 2022-08-29 ENCOUNTER — OFFICE VISIT (OUTPATIENT)
Dept: URGENT CARE | Facility: CLINIC | Age: 27
End: 2022-08-29
Payer: COMMERCIAL

## 2022-08-29 VITALS
DIASTOLIC BLOOD PRESSURE: 74 MMHG | TEMPERATURE: 98 F | SYSTOLIC BLOOD PRESSURE: 110 MMHG | BODY MASS INDEX: 28.04 KG/M2 | HEART RATE: 82 BPM | HEIGHT: 68 IN | WEIGHT: 185 LBS | RESPIRATION RATE: 18 BRPM | OXYGEN SATURATION: 100 %

## 2022-08-29 DIAGNOSIS — R05.9 COUGH: ICD-10-CM

## 2022-08-29 DIAGNOSIS — R09.81 NASAL CONGESTION: ICD-10-CM

## 2022-08-29 DIAGNOSIS — J02.9 ACUTE PHARYNGITIS, UNSPECIFIED ETIOLOGY: ICD-10-CM

## 2022-08-29 DIAGNOSIS — H65.93 BILATERAL NON-SUPPURATIVE OTITIS MEDIA: Primary | ICD-10-CM

## 2022-08-29 LAB
CTP QC/QA: YES
SARS-COV-2 RDRP RESP QL NAA+PROBE: NEGATIVE

## 2022-08-29 PROCEDURE — U0002: ICD-10-PCS | Mod: QW,S$GLB,, | Performed by: NURSE PRACTITIONER

## 2022-08-29 PROCEDURE — 99213 OFFICE O/P EST LOW 20 MIN: CPT | Mod: S$GLB,,, | Performed by: NURSE PRACTITIONER

## 2022-08-29 PROCEDURE — 99213 PR OFFICE/OUTPT VISIT, EST, LEVL III, 20-29 MIN: ICD-10-PCS | Mod: S$GLB,,, | Performed by: NURSE PRACTITIONER

## 2022-08-29 PROCEDURE — U0002 COVID-19 LAB TEST NON-CDC: HCPCS | Mod: QW,S$GLB,, | Performed by: NURSE PRACTITIONER

## 2022-08-29 RX ORDER — AZITHROMYCIN 250 MG/1
TABLET, FILM COATED ORAL
Qty: 6 TABLET | Refills: 0 | Status: SHIPPED | OUTPATIENT
Start: 2022-08-29 | End: 2022-09-03

## 2022-08-29 NOTE — LETTER
August 29, 2022      [facility]  [address], [address]  [place] [address]  Phone: [phone]       Patient: Weston Acuña   YOB: 1995  Date of Visit: 08/29/2022    To Whom It May Concern:    Laura Acuña  was at [facility] on 08/29/2022. The patient may return to work/school on 08/30/2022 with no restrictions. If you have any questions or concerns, or if I can be of further assistance, please do not hesitate to contact me.    Sincerely,    Fede Rowan NP

## 2022-08-29 NOTE — PROGRESS NOTES
"Subjective:       Patient ID: Weston Acuña is a 27 y.o. female.    Vitals:  height is 5' 8" (1.727 m) and weight is 83.9 kg (185 lb). Her tympanic temperature is 97.5 °F (36.4 °C). Her blood pressure is 110/74 and her pulse is 82. Her respiration is 18 and oxygen saturation is 100%.     Chief Complaint: Cough    Cough  This is a new problem. Episode onset: Friday. The problem has been gradually worsening. The problem occurs constantly. The cough is Productive of sputum. Associated symptoms include chills, ear congestion, ear pain, a fever (on Friday 100.3), nasal congestion, postnasal drip, a sore throat (6/10 on numeric scale) and sweats. Pertinent negatives include no chest pain, headaches, heartburn, hemoptysis, myalgias, rash, rhinorrhea, shortness of breath, weight loss or wheezing. Nothing aggravates the symptoms. Treatments tried: Ibuprofen and Dayquil. The treatment provided no relief. There is no history of asthma, bronchiectasis, bronchitis, COPD, emphysema, environmental allergies or pneumonia.     Constitution: Positive for chills, sweating, fatigue and fever (on Friday 100.3).   HENT:  Positive for ear pain, postnasal drip and sore throat (6/10 on numeric scale).    Cardiovascular:  Negative for chest pain.   Respiratory:  Positive for cough. Negative for bloody sputum, shortness of breath and wheezing.    Gastrointestinal:  Negative for heartburn.   Musculoskeletal:  Negative for muscle ache.   Skin:  Negative for rash.   Allergic/Immunologic: Negative for environmental allergies.   Neurological:  Negative for headaches.     Objective:      Physical Exam   Constitutional: She appears well-developed.  Non-toxic appearance. She does not appear ill. No distress.   HENT:   Head: Normocephalic and atraumatic.   Ears:   Right Ear: External ear and ear canal normal. Tympanic membrane is erythematous and bulging. Tympanic membrane is not injected and not perforated.   Left Ear: External ear and ear canal " normal. Tympanic membrane is erythematous and bulging. Tympanic membrane is not injected and not perforated.   Nose: Nose normal.   Eyes: Conjunctivae and EOM are normal.   Neck: Neck supple.   Cardiovascular: Normal rate and regular rhythm.   Pulmonary/Chest: Effort normal and breath sounds normal.   Abdominal: Normal appearance.   Musculoskeletal: Normal range of motion.         General: Normal range of motion.   Neurological: no focal deficit. She is alert. She displays no weakness. Gait normal.   Skin: Skin is warm, dry, not diaphoretic, not pale and no rash.   Psychiatric: Her behavior is normal.       Results for orders placed or performed in visit on 08/29/22   POCT COVID-19 Rapid Screening   Result Value Ref Range    POC Rapid COVID Negative Negative     Acceptable Yes        Assessment:       1. Bilateral non-suppurative otitis media    2. Acute pharyngitis, unspecified etiology    3. Cough    4. Nasal congestion          Plan:         Bilateral non-suppurative otitis media  -     azithromycin (Z-CT) 250 MG tablet; Take 2 tablets by mouth on day 1; Take 1 tablet by mouth on days 2-5  Dispense: 6 tablet; Refill: 0    Acute pharyngitis, unspecified etiology  -     azithromycin (Z-CT) 250 MG tablet; Take 2 tablets by mouth on day 1; Take 1 tablet by mouth on days 2-5  Dispense: 6 tablet; Refill: 0    Cough  -     POCT COVID-19 Rapid Screening  -     dexchlorphen-phenylephrine-DM 1-5-10 mg/5 mL Syrp; Take 10 mLs by mouth every 4 (four) hours as needed (cough/congestion).  Dispense: 480 mL; Refill: 0    Nasal congestion  -     dexchlorphen-phenylephrine-DM 1-5-10 mg/5 mL Syrp; Take 10 mLs by mouth every 4 (four) hours as needed (cough/congestion).  Dispense: 480 mL; Refill: 0       Patient Instructions   Please follow up with your Primary care provider within 2-5 days if your signs and symptoms have not resolved or worsen.     If your condition worsens or fails to improve we recommend that you  receive another evaluation at the emergency room immediately or contact your primary medical clinic to discuss your concerns.   You must understand that you have received an Urgent Care treatment only and that you may be released before all of your medical problems are known or treated. You, the patient, will arrange for follow up care as instructed.     RED FLAGS/WARNING SYMPTOMS DISCUSSED WITH PATIENT THAT WOULD WARRANT EMERGENT MEDICAL ATTENTION. PATIENT VERBALIZED UNDERSTANDING.

## 2022-09-12 ENCOUNTER — OFFICE VISIT (OUTPATIENT)
Dept: URGENT CARE | Facility: CLINIC | Age: 27
End: 2022-09-12
Payer: COMMERCIAL

## 2022-09-12 VITALS
WEIGHT: 185 LBS | TEMPERATURE: 98 F | RESPIRATION RATE: 20 BRPM | BODY MASS INDEX: 28.04 KG/M2 | HEART RATE: 91 BPM | OXYGEN SATURATION: 98 % | HEIGHT: 68 IN | DIASTOLIC BLOOD PRESSURE: 71 MMHG | SYSTOLIC BLOOD PRESSURE: 108 MMHG

## 2022-09-12 DIAGNOSIS — R09.82 ALLERGIC RHINITIS WITH POSTNASAL DRIP: ICD-10-CM

## 2022-09-12 DIAGNOSIS — H60.502 ACUTE OTITIS EXTERNA OF LEFT EAR, UNSPECIFIED TYPE: Primary | ICD-10-CM

## 2022-09-12 DIAGNOSIS — J30.9 ALLERGIC RHINITIS WITH POSTNASAL DRIP: ICD-10-CM

## 2022-09-12 PROCEDURE — 99213 OFFICE O/P EST LOW 20 MIN: CPT | Mod: S$GLB,,, | Performed by: NURSE PRACTITIONER

## 2022-09-12 PROCEDURE — 99213 PR OFFICE/OUTPT VISIT, EST, LEVL III, 20-29 MIN: ICD-10-PCS | Mod: S$GLB,,, | Performed by: NURSE PRACTITIONER

## 2022-09-12 RX ORDER — CIPROFLOXACIN AND DEXAMETHASONE 3; 1 MG/ML; MG/ML
4 SUSPENSION/ DROPS AURICULAR (OTIC) 2 TIMES DAILY
Qty: 7.5 ML | Refills: 0 | Status: SHIPPED | OUTPATIENT
Start: 2022-09-12 | End: 2022-12-27

## 2022-09-12 NOTE — PATIENT INSTRUCTIONS
Instill antibiotic drops as prescribed.  Avoid getting water into ear.  Take Zyrtec-D as directed on label for postnasal drip and ear congestion.  Take tylenol/ibuprofen for fever and pain.  Follow up with PCP or ENT if symptoms persist or worsen.

## 2022-09-12 NOTE — PROGRESS NOTES
"Subjective:       Patient ID: Weston Acuña is a 27 y.o. female.    Vitals:  height is 5' 8" (1.727 m) and weight is 83.9 kg (185 lb). Her tympanic temperature is 97.8 °F (36.6 °C). Her blood pressure is 108/71 and her pulse is 91. Her respiration is 20 and oxygen saturation is 98%.     Chief Complaint: Otalgia    Pt was seen in  approx 2 weeks ago and treated for bilateral ear infections. States she got better but began having ear pain 1-2 days ago with decreased hearing. Also c/o persistent postnasal drip.    Otalgia   There is pain in both ears. This is a recurrent problem. The current episode started 1 to 4 weeks ago (Started About two weeks ago). The problem occurs constantly. The problem has been gradually worsening. There has been no fever. The pain is at a severity of 7/10. The pain is moderate. Associated symptoms include coughing, ear discharge (left), headaches, hearing loss (left), neck pain and a sore throat. Pertinent negatives include no abdominal pain, diarrhea, rash, rhinorrhea or vomiting. Treatments tried: ibuprofen. The treatment provided mild relief.     HENT:  Positive for ear pain, ear discharge (left), hearing loss (left) and sore throat.    Neck: Positive for neck pain.   Respiratory:  Positive for cough.    Gastrointestinal:  Negative for abdominal pain, vomiting and diarrhea.   Skin:  Negative for rash.   Neurological:  Positive for headaches.     Objective:      Physical Exam   Constitutional: She is oriented to person, place, and time. She appears well-developed. She is cooperative.  Non-toxic appearance. She does not appear ill. No distress.   HENT:   Head: Normocephalic and atraumatic.   Ears:   Right Ear: Hearing, tympanic membrane, external ear and ear canal normal. No middle ear effusion.   Left Ear: External ear and ear canal normal. There is swelling (ear canal) and tenderness. Tympanic membrane is erythematous.  No middle ear effusion. Decreased hearing is noted.   Nose: " Nose normal. No mucosal edema, rhinorrhea or nasal deformity. No epistaxis. Right sinus exhibits no maxillary sinus tenderness and no frontal sinus tenderness. Left sinus exhibits no maxillary sinus tenderness and no frontal sinus tenderness.   Mouth/Throat: Uvula is midline, oropharynx is clear and moist and mucous membranes are normal. No trismus in the jaw. Normal dentition. No uvula swelling. Cobblestoning present. No oropharyngeal exudate, posterior oropharyngeal edema or posterior oropharyngeal erythema.   Eyes: Conjunctivae and lids are normal. No scleral icterus.   Neck: Trachea normal and phonation normal. Neck supple. No edema present. No erythema present. No neck rigidity present.   Cardiovascular: Normal rate, regular rhythm, normal heart sounds and normal pulses.   Pulmonary/Chest: Effort normal and breath sounds normal. No respiratory distress. She has no decreased breath sounds. She has no wheezes. She has no rhonchi.   Abdominal: Normal appearance.   Musculoskeletal: Normal range of motion.         General: No deformity. Normal range of motion.   Neurological: She is alert and oriented to person, place, and time. She exhibits normal muscle tone. Coordination normal.   Skin: Skin is warm, dry, intact, not diaphoretic and not pale.   Psychiatric: Her speech is normal and behavior is normal. Judgment and thought content normal.   Nursing note and vitals reviewed.      Assessment:       1. Acute otitis externa of left ear, unspecified type          Plan:         Acute otitis externa of left ear, unspecified type  -     ciprofloxacin-dexamethasone 0.3-0.1% (CIPRODEX) 0.3-0.1 % DrpS; Place 4 drops into the left ear 2 (two) times daily.  Dispense: 7.5 mL; Refill: 0               Instill antibiotic drops as prescribed.  Avoid getting water into ear.  Take Zyrtec-D as directed on label for postnasal drip and ear congestion.  Take tylenol/ibuprofen for fever and pain.  Follow up with PCP or ENT if symptoms  persist or worsen.

## 2022-09-15 ENCOUNTER — TELEPHONE (OUTPATIENT)
Dept: URGENT CARE | Facility: CLINIC | Age: 27
End: 2022-09-15
Payer: COMMERCIAL

## 2022-09-26 ENCOUNTER — TELEPHONE (OUTPATIENT)
Dept: PRIMARY CARE CLINIC | Facility: CLINIC | Age: 27
End: 2022-09-26
Payer: COMMERCIAL

## 2022-09-26 DIAGNOSIS — Z00.00 ROUTINE GENERAL MEDICAL EXAMINATION AT A HEALTH CARE FACILITY: Primary | ICD-10-CM

## 2022-09-26 NOTE — TELEPHONE ENCOUNTER
----- Message from Niyah Verdugo sent at 9/26/2022  3:32 PM CDT -----  Patient sent a portal request, asking for lab orders, she hasn't been seen and an appt was made but not until 12/27. Please advise and reply back to the patient thru the portal    Thank you

## 2022-09-27 NOTE — TELEPHONE ENCOUNTER
I have signed for the following orders AND/OR meds.  Please call the patient and ask the patient to schedule the testing AND/OR inform about any medications that were sent.      Orders Placed This Encounter   Procedures    CBC Auto Differential     Standing Status:   Future     Standing Expiration Date:   11/25/2023    TSH     Standing Status:   Future     Standing Expiration Date:   11/25/2023    Comprehensive Metabolic Panel     Standing Status:   Future     Standing Expiration Date:   11/25/2023    Lipid Panel     Standing Status:   Future     Standing Expiration Date:   11/25/2023

## 2022-11-08 ENCOUNTER — OFFICE VISIT (OUTPATIENT)
Dept: URGENT CARE | Facility: CLINIC | Age: 27
End: 2022-11-08
Payer: COMMERCIAL

## 2022-11-08 VITALS
BODY MASS INDEX: 28.04 KG/M2 | RESPIRATION RATE: 18 BRPM | WEIGHT: 185 LBS | DIASTOLIC BLOOD PRESSURE: 69 MMHG | HEIGHT: 68 IN | TEMPERATURE: 98 F | OXYGEN SATURATION: 100 % | SYSTOLIC BLOOD PRESSURE: 116 MMHG | HEART RATE: 84 BPM

## 2022-11-08 DIAGNOSIS — J32.9 BACTERIAL SINUSITIS: Primary | ICD-10-CM

## 2022-11-08 DIAGNOSIS — B96.89 BACTERIAL SINUSITIS: Primary | ICD-10-CM

## 2022-11-08 DIAGNOSIS — R05.9 COUGH, UNSPECIFIED TYPE: ICD-10-CM

## 2022-11-08 LAB
CTP QC/QA: YES
POC MOLECULAR INFLUENZA A AGN: NEGATIVE
POC MOLECULAR INFLUENZA B AGN: NEGATIVE

## 2022-11-08 PROCEDURE — 99213 PR OFFICE/OUTPT VISIT, EST, LEVL III, 20-29 MIN: ICD-10-PCS | Mod: S$GLB,,, | Performed by: NURSE PRACTITIONER

## 2022-11-08 PROCEDURE — 87502 POCT INFLUENZA A/B MOLECULAR: ICD-10-PCS | Mod: QW,S$GLB,, | Performed by: NURSE PRACTITIONER

## 2022-11-08 PROCEDURE — 87502 INFLUENZA DNA AMP PROBE: CPT | Mod: QW,S$GLB,, | Performed by: NURSE PRACTITIONER

## 2022-11-08 PROCEDURE — 99213 OFFICE O/P EST LOW 20 MIN: CPT | Mod: S$GLB,,, | Performed by: NURSE PRACTITIONER

## 2022-11-08 RX ORDER — AMOXICILLIN AND CLAVULANATE POTASSIUM 875; 125 MG/1; MG/1
1 TABLET, FILM COATED ORAL 2 TIMES DAILY
Qty: 14 TABLET | Refills: 0 | Status: SHIPPED | OUTPATIENT
Start: 2022-11-08 | End: 2022-11-15

## 2022-11-08 RX ORDER — METHYLPREDNISOLONE 4 MG/1
TABLET ORAL
Qty: 21 EACH | Refills: 0 | Status: SHIPPED | OUTPATIENT
Start: 2022-11-08 | End: 2022-12-27

## 2022-11-08 NOTE — PATIENT INSTRUCTIONS
Rest and increase fluids.   May apply warm compresses as needed.   Saline nasal spray or saline irrigation (Neti pot) to loosen nasal congestion.  You may continue to take Dayquil and Nyquil as directed on label for symptoms.  Flonase or Nasacort to reduce inflammation in the sinus cavities.  Take antibiotics exactly as prescribed. Make sure to complete the entire course of antibiotics even if you start feeling better. This will prevent recurrence of your infection and bacterial resistance.   Take an over the counter 24 hour antihistamine such as Claritin or Zyrtec for postnasal drip and other allergy symptoms.  For sore throat, gargling with warm salt water, Cepacol throat lozenges, or Chloraseptic spray may help with pain.  You may take Tylenol or Ibuprofen as needed for fever, throat pain, or body aches.   Follow up with your primary care provider or with ENT if not improved within a few days or sooner for any new or worsening symptoms.   Go to the ER for any fever that does not improve with Tylenol/Ibuprofen, neck stiffness, rash, severe headache, vision changes, shortness of breath, chest pain, severe facial pain or swelling, or for any other new and concerning symptoms.

## 2022-11-08 NOTE — PROGRESS NOTES
" Subjective:       Patient ID: Weston Acuña is a 27 y.o. female.    Vitals:  height is 5' 8" (1.727 m) and weight is 83.9 kg (185 lb). Her tympanic temperature is 97.6 °F (36.4 °C). Her blood pressure is 116/69 and her pulse is 84. Her respiration is 18 and oxygen saturation is 100%.     Chief Complaint: Sinus Problem    Pt c/o congestion, wet cough starting over the weekend. Pt denies fever, body aches,     Sinus Problem  This is a new problem. The current episode started in the past 7 days. The problem has been gradually worsening since onset. There has been no fever. Her pain is at a severity of 2/10. The pain is mild. Associated symptoms include congestion, coughing, a hoarse voice, neck pain, sinus pressure, sneezing and a sore throat. Pertinent negatives include no chills, diaphoresis, ear pain, headaches, shortness of breath or swollen glands. Treatments tried: dayquil, nyquil. The treatment provided no relief.     Constitution: Negative for chills and sweating.   HENT:  Positive for congestion, sinus pressure and sore throat. Negative for ear pain.    Neck: Positive for neck pain.   Respiratory:  Positive for cough. Negative for shortness of breath.    Allergic/Immunologic: Positive for sneezing.   Neurological:  Negative for headaches.     Objective:      Physical Exam   Constitutional: She is oriented to person, place, and time. She appears well-developed. She is cooperative.  Non-toxic appearance. She does not appear ill. No distress.   HENT:   Head: Normocephalic and atraumatic.   Ears:   Right Ear: Hearing, external ear and ear canal normal. Tympanic membrane is not erythematous. A middle ear effusion is present.   Left Ear: Hearing, external ear and ear canal normal. Tympanic membrane is not erythematous. A middle ear effusion is present.   Nose: Mucosal edema present. No rhinorrhea or nasal deformity. No epistaxis. Right sinus exhibits no maxillary sinus tenderness and no frontal sinus " tenderness. Left sinus exhibits no maxillary sinus tenderness and no frontal sinus tenderness.   Mouth/Throat: Uvula is midline, oropharynx is clear and moist and mucous membranes are normal. No trismus in the jaw. Normal dentition. No uvula swelling. Cobblestoning present. No oropharyngeal exudate, posterior oropharyngeal edema or posterior oropharyngeal erythema.   Hoarse voice      Comments: Hoarse voice  Eyes: Conjunctivae and lids are normal. No scleral icterus.   Neck: Trachea normal and phonation normal. Neck supple. No edema present. No erythema present. No neck rigidity present.   Cardiovascular: Normal rate, regular rhythm, normal heart sounds and normal pulses.   Pulmonary/Chest: Effort normal and breath sounds normal. No respiratory distress. She has no decreased breath sounds. She has no wheezes. She has no rhonchi.   Abdominal: Normal appearance.   Musculoskeletal: Normal range of motion.         General: No deformity. Normal range of motion.   Neurological: She is alert and oriented to person, place, and time. She exhibits normal muscle tone. Coordination normal.   Skin: Skin is warm, dry, intact, not diaphoretic and not pale.   Psychiatric: Her speech is normal and behavior is normal. Judgment and thought content normal.   Nursing note and vitals reviewed.      Assessment:       1. Bacterial sinusitis    2. Cough, unspecified type          Plan:         Bacterial sinusitis  -     amoxicillin-clavulanate 875-125mg (AUGMENTIN) 875-125 mg per tablet; Take 1 tablet by mouth 2 (two) times daily. for 7 days  Dispense: 14 tablet; Refill: 0  -     methylPREDNISolone (MEDROL DOSEPACK) 4 mg tablet; use as directed  Dispense: 21 each; Refill: 0    Cough, unspecified type  -     POCT Influenza A/B MOLECULAR               Results for orders placed or performed in visit on 11/08/22   POCT Influenza A/B MOLECULAR   Result Value Ref Range    POC Molecular Influenza A Ag Negative Negative, Not Reported    POC  Molecular Influenza B Ag Negative Negative, Not Reported     Acceptable Yes      Lab result reviewed and discussed with patient.    Rest and increase fluids.   May apply warm compresses as needed.   Saline nasal spray or saline irrigation (Neti pot) to loosen nasal congestion.  You may continue to take Dayquil and Nyquil as directed on label for symptoms.  Flonase or Nasacort to reduce inflammation in the sinus cavities.  Take antibiotics exactly as prescribed. Make sure to complete the entire course of antibiotics even if you start feeling better. This will prevent recurrence of your infection and bacterial resistance.   Take an over the counter 24 hour antihistamine such as Claritin or Zyrtec for postnasal drip and other allergy symptoms.  For sore throat, gargling with warm salt water, Cepacol throat lozenges, or Chloraseptic spray may help with pain.  You may take Tylenol or Ibuprofen as needed for fever, throat pain, or body aches.   Follow up with your primary care provider or with ENT if not improved within a few days or sooner for any new or worsening symptoms.   Go to the ER for any fever that does not improve with Tylenol/Ibuprofen, neck stiffness, rash, severe headache, vision changes, shortness of breath, chest pain, severe facial pain or swelling, or for any other new and concerning symptoms.

## 2022-12-22 ENCOUNTER — LAB VISIT (OUTPATIENT)
Dept: LAB | Facility: HOSPITAL | Age: 27
End: 2022-12-22
Attending: FAMILY MEDICINE
Payer: COMMERCIAL

## 2022-12-22 DIAGNOSIS — Z00.00 ROUTINE GENERAL MEDICAL EXAMINATION AT A HEALTH CARE FACILITY: ICD-10-CM

## 2022-12-22 LAB
ALBUMIN SERPL BCP-MCNC: 4.1 G/DL (ref 3.5–5.2)
ALP SERPL-CCNC: 51 U/L (ref 55–135)
ALT SERPL W/O P-5'-P-CCNC: 27 U/L (ref 10–44)
ANION GAP SERPL CALC-SCNC: 10 MMOL/L (ref 8–16)
AST SERPL-CCNC: 22 U/L (ref 10–40)
BASOPHILS # BLD AUTO: 0.02 K/UL (ref 0–0.2)
BASOPHILS NFR BLD: 0.4 % (ref 0–1.9)
BILIRUB SERPL-MCNC: 0.7 MG/DL (ref 0.1–1)
BUN SERPL-MCNC: 13 MG/DL (ref 6–20)
CALCIUM SERPL-MCNC: 9.7 MG/DL (ref 8.7–10.5)
CHLORIDE SERPL-SCNC: 105 MMOL/L (ref 95–110)
CHOLEST SERPL-MCNC: 215 MG/DL (ref 120–199)
CHOLEST/HDLC SERPL: 2.9 {RATIO} (ref 2–5)
CO2 SERPL-SCNC: 23 MMOL/L (ref 23–29)
CREAT SERPL-MCNC: 1.1 MG/DL (ref 0.5–1.4)
DIFFERENTIAL METHOD: ABNORMAL
EOSINOPHIL # BLD AUTO: 0.1 K/UL (ref 0–0.5)
EOSINOPHIL NFR BLD: 2.1 % (ref 0–8)
ERYTHROCYTE [DISTWIDTH] IN BLOOD BY AUTOMATED COUNT: 12 % (ref 11.5–14.5)
EST. GFR  (NO RACE VARIABLE): >60 ML/MIN/1.73 M^2
GLUCOSE SERPL-MCNC: 79 MG/DL (ref 70–110)
HCT VFR BLD AUTO: 41.9 % (ref 37–48.5)
HDLC SERPL-MCNC: 74 MG/DL (ref 40–75)
HDLC SERPL: 34.4 % (ref 20–50)
HGB BLD-MCNC: 13.8 G/DL (ref 12–16)
IMM GRANULOCYTES # BLD AUTO: 0 K/UL (ref 0–0.04)
IMM GRANULOCYTES NFR BLD AUTO: 0 % (ref 0–0.5)
LDLC SERPL CALC-MCNC: 121.2 MG/DL (ref 63–159)
LYMPHOCYTES # BLD AUTO: 1.8 K/UL (ref 1–4.8)
LYMPHOCYTES NFR BLD: 37.1 % (ref 18–48)
MCH RBC QN AUTO: 31.9 PG (ref 27–31)
MCHC RBC AUTO-ENTMCNC: 32.9 G/DL (ref 32–36)
MCV RBC AUTO: 97 FL (ref 82–98)
MONOCYTES # BLD AUTO: 0.4 K/UL (ref 0.3–1)
MONOCYTES NFR BLD: 8 % (ref 4–15)
NEUTROPHILS # BLD AUTO: 2.5 K/UL (ref 1.8–7.7)
NEUTROPHILS NFR BLD: 52.4 % (ref 38–73)
NONHDLC SERPL-MCNC: 141 MG/DL
NRBC BLD-RTO: 0 /100 WBC
PLATELET # BLD AUTO: 239 K/UL (ref 150–450)
PMV BLD AUTO: 12 FL (ref 9.2–12.9)
POTASSIUM SERPL-SCNC: 4.6 MMOL/L (ref 3.5–5.1)
PROT SERPL-MCNC: 6.8 G/DL (ref 6–8.4)
RBC # BLD AUTO: 4.33 M/UL (ref 4–5.4)
SODIUM SERPL-SCNC: 138 MMOL/L (ref 136–145)
TRIGL SERPL-MCNC: 99 MG/DL (ref 30–150)
TSH SERPL DL<=0.005 MIU/L-ACNC: 1.08 UIU/ML (ref 0.4–4)
WBC # BLD AUTO: 4.74 K/UL (ref 3.9–12.7)

## 2022-12-22 PROCEDURE — 80053 COMPREHEN METABOLIC PANEL: CPT | Performed by: FAMILY MEDICINE

## 2022-12-22 PROCEDURE — 84443 ASSAY THYROID STIM HORMONE: CPT | Performed by: FAMILY MEDICINE

## 2022-12-22 PROCEDURE — 80061 LIPID PANEL: CPT | Performed by: FAMILY MEDICINE

## 2022-12-22 PROCEDURE — 85025 COMPLETE CBC W/AUTO DIFF WBC: CPT | Performed by: FAMILY MEDICINE

## 2022-12-22 PROCEDURE — 36415 COLL VENOUS BLD VENIPUNCTURE: CPT | Mod: PO | Performed by: FAMILY MEDICINE

## 2022-12-27 ENCOUNTER — OFFICE VISIT (OUTPATIENT)
Dept: PRIMARY CARE CLINIC | Facility: CLINIC | Age: 27
End: 2022-12-27
Payer: COMMERCIAL

## 2022-12-27 VITALS
HEIGHT: 68 IN | HEART RATE: 80 BPM | DIASTOLIC BLOOD PRESSURE: 75 MMHG | TEMPERATURE: 98 F | WEIGHT: 203.38 LBS | SYSTOLIC BLOOD PRESSURE: 122 MMHG | BODY MASS INDEX: 30.82 KG/M2

## 2022-12-27 DIAGNOSIS — Z00.00 ROUTINE GENERAL MEDICAL EXAMINATION AT A HEALTH CARE FACILITY: Primary | ICD-10-CM

## 2022-12-27 DIAGNOSIS — F41.1 GAD (GENERALIZED ANXIETY DISORDER): ICD-10-CM

## 2022-12-27 DIAGNOSIS — Z30.41 ENCOUNTER FOR SURVEILLANCE OF CONTRACEPTIVE PILLS: ICD-10-CM

## 2022-12-27 PROBLEM — J06.9 VIRAL UPPER RESPIRATORY TRACT INFECTION: Status: RESOLVED | Noted: 2017-12-23 | Resolved: 2022-12-27

## 2022-12-27 PROCEDURE — 99395 PR PREVENTIVE VISIT,EST,18-39: ICD-10-PCS | Mod: S$GLB,,, | Performed by: FAMILY MEDICINE

## 2022-12-27 PROCEDURE — 99395 PREV VISIT EST AGE 18-39: CPT | Mod: S$GLB,,, | Performed by: FAMILY MEDICINE

## 2022-12-27 PROCEDURE — 99999 PR PBB SHADOW E&M-EST. PATIENT-LVL III: CPT | Mod: PBBFAC,,, | Performed by: FAMILY MEDICINE

## 2022-12-27 PROCEDURE — 99999 PR PBB SHADOW E&M-EST. PATIENT-LVL III: ICD-10-PCS | Mod: PBBFAC,,, | Performed by: FAMILY MEDICINE

## 2022-12-27 RX ORDER — CITALOPRAM 20 MG/1
20 TABLET, FILM COATED ORAL DAILY
Qty: 30 TABLET | Refills: 11 | Status: SHIPPED | OUTPATIENT
Start: 2022-12-27 | End: 2023-01-11 | Stop reason: SDUPTHER

## 2022-12-27 NOTE — PROGRESS NOTES
Subjective:      Patient ID: Weston Acuña is a 27 y.o. female.    Chief Complaint: Annual Exam      Patient is a 27 y.o. female coming in today  has a past medical history of Anxiety state.    Pt presents to clinic today for annual exam and lab work f/u.  Currently on birth control, Buspirone, and Lexapro. 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 Reports anxiety has been stable since last visit. Pt reports she feels intermittently fatigued since she started taking Lexapro.   Lab results reviewed with pt today. No other complaint at this time.     Ohs Peq Documents    12/21/2022  6:46 PM CST - Filed by Patient   Would you like a copy of [facility]'s Financial Assistance Policy Summary? No, I would not like a copy.   Would you like to receive more information regarding your rights and protections under the [employer] [place] Billing act? No, I would not.     Ohs Peq Sdoh    12/27/2022  9:02 AM CST - Filed by Patient   On average, how many days per week do you engage in moderate to strenuous exercise (like a brisk walk)? 4 days   On average, how many minutes do you engage in exercise at this level? 30 min   Do you feel stress - tense, restless, nervous, or anxious, or unable to sleep at night because your mind is troubled all the time - these days? Very much   Do you belong to any clubs or organizations such as Yazidi groups, unions, fraternal or athletic groups, or school groups? No   How often do you attend meetings of the clubs or organizations you belong to? Never   In a typical week, how many times do you talk on the phone with family, friends, or neighbors? More than three times a week   How often do you get together with friends or relatives? Once a week   Are you , , , , never , or living with a partner?    How hard is it for you to pay for the very basics like food, housing, medical care, and heating?  Not hard at all   Within the past 12 months, you worried that your food would run out before you got the money to buy more. Never true   Within the past 12 months, the food you bought just didnt last and you didnt have money to get more. Never true   In the past 12 months, has lack of transportation kept you from medical appointments or from getting medications? No   In the past 12 months, has lack of transportation kept you from meetings, work, or from getting things needed for daily living? No   How often do you have a drink containing alcohol? 2-4 times a month   How many drinks containing alcohol do you have on a typical day when you are drinking? 1 or 2   How often do you have six or more drinks on one occasion? Less than monthly   In the last 12 months, was there a time when you were not able to pay the mortgage or rent on time? No   In the last 12 months, how many places have you lived? (range: at least 0) 3   In the last 12 months, was there a time when you did not have a steady place to sleep or slept in a shelter (including now)? No         Pmh, Psh, Family Hx, Social Hx, HM updated in Epic Tabs today.   Review of Systems   Constitutional:  Positive for fatigue. Negative for activity change, appetite change, chills and fever.   HENT:  Negative for ear pain and trouble swallowing.    Eyes:  Negative for pain and visual disturbance.   Respiratory:  Negative for cough and shortness of breath.    Cardiovascular:  Negative for chest pain and leg swelling.   Gastrointestinal:  Negative for abdominal pain, blood in stool, nausea and vomiting.   Endocrine: Negative for cold intolerance and heat intolerance.   Genitourinary:  Negative for dysuria and frequency.   Musculoskeletal:  Negative for joint swelling, myalgias and neck pain.   Skin:  Negative for color change and rash.   Neurological:  Negative for dizziness and headaches.   Psychiatric/Behavioral:  Negative for behavioral problems and sleep disturbance.  "The patient is nervous/anxious.    Objective:     Vitals:    12/27/22 0930   BP: 122/75   Pulse: 80   Temp: 97.6 °F (36.4 °C)   Weight: 92.3 kg (203 lb 6 oz)   Height: 5' 8" (1.727 m)     Wt Readings from Last 10 Encounters:   12/27/22 92.3 kg (203 lb 6 oz)   11/08/22 83.9 kg (185 lb)   09/12/22 83.9 kg (185 lb)   08/29/22 83.9 kg (185 lb)   05/10/22 84.8 kg (187 lb)   05/05/22 83.6 kg (184 lb 4.9 oz)   03/28/22 84.8 kg (187 lb)   02/25/22 84.8 kg (187 lb)   02/24/22 85.1 kg (187 lb 9.8 oz)   01/11/22 85.8 kg (189 lb 2.5 oz)     Physical Exam  Vitals reviewed.   Constitutional:       Appearance: Normal appearance. She is well-developed. She is obese.   HENT:      Head: Normocephalic and atraumatic.      Right Ear: Tympanic membrane and external ear normal.      Left Ear: Tympanic membrane and external ear normal.      Nose: Nose normal.      Mouth/Throat:      Mouth: Mucous membranes are moist.      Pharynx: Oropharynx is clear.   Eyes:      Conjunctiva/sclera: Conjunctivae normal.      Pupils: Pupils are equal, round, and reactive to light.   Neck:      Thyroid: No thyromegaly.   Cardiovascular:      Rate and Rhythm: Normal rate and regular rhythm.      Heart sounds: Normal heart sounds. No murmur heard.    No friction rub. No gallop.   Pulmonary:      Effort: Pulmonary effort is normal. No respiratory distress.      Breath sounds: Normal breath sounds. No wheezing or rales.   Abdominal:      General: Bowel sounds are normal. There is no distension.      Palpations: Abdomen is soft.      Tenderness: There is no abdominal tenderness. There is no rebound.   Musculoskeletal:         General: Normal range of motion.      Cervical back: Normal range of motion and neck supple.   Lymphadenopathy:      Cervical: No cervical adenopathy.   Skin:     General: Skin is warm and dry.      Findings: No rash.   Neurological:      General: No focal deficit present.      Mental Status: She is alert and oriented to person, place, and " time. Mental status is at baseline.   Psychiatric:         Attention and Perception: Attention and perception normal.         Mood and Affect: Mood and affect normal.         Speech: Speech normal.         Behavior: Behavior normal.         Thought Content: Thought content normal.         Cognition and Memory: Cognition and memory normal.         Judgment: Judgment normal.     Assessment:     1. Routine general medical examination at a health care facility    2. DAVID (generalized anxiety disorder)    3. Encounter for surveillance of contraceptive pills        LABS:   No results found for: HGBA1C   Lab Results   Component Value Date    CHOL 215 (H) 12/22/2022    CHOL 197 04/22/2020    CHOL 198 11/15/2018     Lab Results   Component Value Date    LDLCALC 121.2 12/22/2022    LDLCALC 107.2 04/22/2020    LDLCALC 101.2 11/15/2018     Lab Results   Component Value Date    WBC 4.74 12/22/2022    HGB 13.8 12/22/2022    HCT 41.9 12/22/2022     12/22/2022    CHOL 215 (H) 12/22/2022    TRIG 99 12/22/2022    HDL 74 12/22/2022    ALT 27 12/22/2022    AST 22 12/22/2022     12/22/2022    K 4.6 12/22/2022     12/22/2022    CREATININE 1.1 12/22/2022    BUN 13 12/22/2022    CO2 23 12/22/2022    TSH 1.076 12/22/2022       The ASCVD Risk score (Reid DK, et al., 2019) failed to calculate for the following reasons:    The 2019 ASCVD risk score is only valid for ages 40 to 79    No image results found.      Plan:   Weston was seen today for annual exam.    Diagnoses and all orders for this visit:    Routine general medical examination at a health care facility    DAVID (generalized anxiety disorder)    Encounter for surveillance of contraceptive pills    Other orders  -     citalopram (CELEXA) 20 MG tablet; Take 1 tablet (20 mg total) by mouth once daily.        There are no Patient Instructions on file for this visit.    - above diagnoses were discussed and reviewed today during visit. The conditions are stable.  Continue  with current medications and interventions. Labs reviewed.   Instructed to switch from Lexapro to Celexa due to fatigue.  Rx Celexa 20 mg and buspar prn for anxiety treatment.  Instructed to f/u in 1 year.     Follow up in about 1 year (around 12/27/2023) for physical with Dr SHARON Rutledge Attestation:   I, Petar Randolph, am scribing for, and in the presence of, Dr. Madhuri Otto MD. I performed the above scribed service and the documentation accurately describes the services I performed. I attest to the accuracy of the note.    I, Dr. Madhuri Otto MD, reviewed documentation as scribed above. I personally performed the services described in this documentation.  I agree that the record reflects my personal performance and is accurate and complete. Madhuri Otto MD.    12/27/2022

## 2023-01-16 NOTE — TELEPHONE ENCOUNTER
Ok due to the varicella lab work not being done do you want to proceed with the injections?    Methotrexate Pregnancy And Lactation Text: This medication is Pregnancy Category X and is known to cause fetal harm. This medication is excreted in breast milk.

## 2023-09-19 RX ORDER — BUSPIRONE HYDROCHLORIDE 10 MG/1
10 TABLET ORAL 2 TIMES DAILY
Qty: 180 TABLET | Refills: 1 | Status: SHIPPED | OUTPATIENT
Start: 2023-09-19 | End: 2024-01-23 | Stop reason: SDUPTHER

## 2023-10-27 ENCOUNTER — PATIENT MESSAGE (OUTPATIENT)
Dept: PRIMARY CARE CLINIC | Facility: CLINIC | Age: 28
End: 2023-10-27

## 2023-11-01 ENCOUNTER — OFFICE VISIT (OUTPATIENT)
Dept: PRIMARY CARE CLINIC | Facility: CLINIC | Age: 28
End: 2023-11-01
Payer: COMMERCIAL

## 2023-11-01 VITALS
HEART RATE: 68 BPM | DIASTOLIC BLOOD PRESSURE: 69 MMHG | BODY MASS INDEX: 33.75 KG/M2 | WEIGHT: 222.69 LBS | HEIGHT: 68 IN | OXYGEN SATURATION: 98 % | TEMPERATURE: 97 F | SYSTOLIC BLOOD PRESSURE: 128 MMHG

## 2023-11-01 DIAGNOSIS — Z79.899 MEDICAL MARIJUANA USE: ICD-10-CM

## 2023-11-01 DIAGNOSIS — R63.5 ABNORMAL WEIGHT GAIN: ICD-10-CM

## 2023-11-01 DIAGNOSIS — F41.1 GAD (GENERALIZED ANXIETY DISORDER): ICD-10-CM

## 2023-11-01 DIAGNOSIS — Z00.00 ROUTINE GENERAL MEDICAL EXAMINATION AT A HEALTH CARE FACILITY: Primary | ICD-10-CM

## 2023-11-01 PROCEDURE — 3074F PR MOST RECENT SYSTOLIC BLOOD PRESSURE < 130 MM HG: ICD-10-PCS | Mod: CPTII,S$GLB,, | Performed by: FAMILY MEDICINE

## 2023-11-01 PROCEDURE — 99999 PR PBB SHADOW E&M-EST. PATIENT-LVL IV: ICD-10-PCS | Mod: PBBFAC,,, | Performed by: FAMILY MEDICINE

## 2023-11-01 PROCEDURE — 3078F DIAST BP <80 MM HG: CPT | Mod: CPTII,S$GLB,, | Performed by: FAMILY MEDICINE

## 2023-11-01 PROCEDURE — 3008F BODY MASS INDEX DOCD: CPT | Mod: CPTII,S$GLB,, | Performed by: FAMILY MEDICINE

## 2023-11-01 PROCEDURE — 1159F PR MEDICATION LIST DOCUMENTED IN MEDICAL RECORD: ICD-10-PCS | Mod: CPTII,S$GLB,, | Performed by: FAMILY MEDICINE

## 2023-11-01 PROCEDURE — 99999 PR PBB SHADOW E&M-EST. PATIENT-LVL IV: CPT | Mod: PBBFAC,,, | Performed by: FAMILY MEDICINE

## 2023-11-01 PROCEDURE — 3008F PR BODY MASS INDEX (BMI) DOCUMENTED: ICD-10-PCS | Mod: CPTII,S$GLB,, | Performed by: FAMILY MEDICINE

## 2023-11-01 PROCEDURE — 3074F SYST BP LT 130 MM HG: CPT | Mod: CPTII,S$GLB,, | Performed by: FAMILY MEDICINE

## 2023-11-01 PROCEDURE — 1159F MED LIST DOCD IN RCRD: CPT | Mod: CPTII,S$GLB,, | Performed by: FAMILY MEDICINE

## 2023-11-01 PROCEDURE — 99395 PR PREVENTIVE VISIT,EST,18-39: ICD-10-PCS | Mod: S$GLB,,, | Performed by: FAMILY MEDICINE

## 2023-11-01 PROCEDURE — 99395 PREV VISIT EST AGE 18-39: CPT | Mod: S$GLB,,, | Performed by: FAMILY MEDICINE

## 2023-11-01 PROCEDURE — 3078F PR MOST RECENT DIASTOLIC BLOOD PRESSURE < 80 MM HG: ICD-10-PCS | Mod: CPTII,S$GLB,, | Performed by: FAMILY MEDICINE

## 2023-11-01 NOTE — PROGRESS NOTES
Subjective:      Patient ID: Weston Zuleta is a 28 y.o. female.    Chief Complaint: Annual Exam      Patient is a 28 y.o. female coming in today for annual exam.   Anxiety has improved since last visit. She continues to have occasional insomnia. Pt has started on medical marijuana since last visit for anxiety and insomnia. Pt met with OBGYN for Pap Smear. She is also working on weight loss. Has been following healthy eating habits and regular exercise for weight loss. BP is stable in clinic. No other health concern at this time.       1. Routine general medical examination at a health care facility    2. Medical marijuana use    3. DAVID (generalized anxiety disorder)    4. Abnormal weight gain    5. BMI 33.0-33.9,adult       Ohs Peq Sdoh    10/30/2023 11:49 AM CDT - Filed by Patient   On average, how many days per week do you engage in moderate to strenuous exercise (like a brisk walk)? 4 days   On average, how many minutes do you engage in exercise at this level? 30 min   Do you feel stress - tense, restless, nervous, or anxious, or unable to sleep at night because your mind is troubled all the time - these days? To some extent   Do you belong to any clubs or organizations such as Religion groups, unions, fraternal or athletic groups, or school groups? No   How often do you attend meetings of the clubs or organizations you belong to? Patient refused   In a typical week, how many times do you talk on the phone with family, friends, or neighbors? More than three times a week   How often do you get together with friends or relatives? More than three times a week   Are you , , , , never , or living with a partner?    How hard is it for you to pay for the very basics like food, housing, medical care, and heating? Not hard at all   Within the past 12 months, you worried that your food would run out before you got the money to buy more. Never true   Within the past 12  "months, the food you bought just didnt last and you didnt have money to get more. Never true   In the past 12 months, has lack of transportation kept you from medical appointments or from getting medications? No   In the past 12 months, has lack of transportation kept you from meetings, work, or from getting things needed for daily living? No   How often do you have a drink containing alcohol? 2-4 times a month   How many drinks containing alcohol do you have on a typical day when you are drinking? 1 or 2   How often do you have six or more drinks on one occasion? Less than monthly   In the last 12 months, was there a time when you were not able to pay the mortgage or rent on time? No   In the last 12 months, how many places have you lived? (range: at least 0) 1   In the last 12 months, was there a time when you did not have a steady place to sleep or slept in a shelter (including now)? No         Pmh, Psh, Family Hx, Social Hx, HM updated in Epic Tabs today.   Review of Systems   Constitutional:  Negative for chills, fatigue and fever.   HENT:  Negative for ear pain and trouble swallowing.    Eyes:  Negative for pain and visual disturbance.   Respiratory:  Negative for cough and shortness of breath.    Cardiovascular:  Negative for chest pain and leg swelling.   Gastrointestinal:  Negative for abdominal pain, blood in stool, nausea and vomiting.   Endocrine: Negative for cold intolerance and heat intolerance.   Genitourinary:  Negative for dysuria and frequency.   Musculoskeletal:  Negative for joint swelling, myalgias and neck pain.   Skin:  Negative for color change and rash.   Neurological:  Negative for dizziness and headaches.   Psychiatric/Behavioral:  Negative for behavioral problems and sleep disturbance.      Objective:     Vitals:    11/01/23 1448   BP: 128/69   Pulse: 68   Temp: 97.3 °F (36.3 °C)   SpO2: 98%   Weight: 101 kg (222 lb 10.6 oz)   Height: 5' 8" (1.727 m)     Wt Readings from Last 10 " Encounters:   11/01/23 101 kg (222 lb 10.6 oz)   10/18/23 99.7 kg (219 lb 11.2 oz)   05/11/23 93.9 kg (207 lb)   12/27/22 92.3 kg (203 lb 6 oz)   11/08/22 83.9 kg (185 lb)   09/12/22 83.9 kg (185 lb)   08/29/22 83.9 kg (185 lb)   05/10/22 84.8 kg (187 lb)   05/05/22 83.6 kg (184 lb 4.9 oz)   03/28/22 84.8 kg (187 lb)     Physical Exam  Vitals reviewed.   Constitutional:       Appearance: Normal appearance. She is well-developed. She is obese.   HENT:      Head: Normocephalic and atraumatic.      Right Ear: Tympanic membrane and external ear normal.      Left Ear: Tympanic membrane and external ear normal.      Nose: Nose normal.      Mouth/Throat:      Mouth: Mucous membranes are moist.      Pharynx: Oropharynx is clear.   Eyes:      Conjunctiva/sclera: Conjunctivae normal.      Pupils: Pupils are equal, round, and reactive to light.   Neck:      Thyroid: No thyromegaly.   Cardiovascular:      Rate and Rhythm: Normal rate and regular rhythm.      Heart sounds: Normal heart sounds. No murmur heard.     No friction rub. No gallop.   Pulmonary:      Effort: Pulmonary effort is normal. No respiratory distress.      Breath sounds: Normal breath sounds. No wheezing or rales.   Abdominal:      General: Bowel sounds are normal. There is no distension.      Palpations: Abdomen is soft.      Tenderness: There is no abdominal tenderness. There is no rebound.   Musculoskeletal:         General: Normal range of motion.      Cervical back: Normal range of motion and neck supple.   Lymphadenopathy:      Cervical: No cervical adenopathy.   Skin:     General: Skin is warm and dry.      Findings: No rash.   Neurological:      Mental Status: She is alert and oriented to person, place, and time.   Psychiatric:         Attention and Perception: Attention and perception normal.         Mood and Affect: Mood normal. Affect is tearful.         Speech: Speech normal.         Behavior: Behavior normal.         Thought Content: Thought  "content normal.         Cognition and Memory: Cognition and memory normal.         Judgment: Judgment normal.         Assessment:     1. Routine general medical examination at a health care facility    2. Medical marijuana use    3. NIKITA (generalized anxiety disorder)    4. Abnormal weight gain    5. BMI 33.0-33.9,adult        Plan:   Weston was seen today for annual exam.    Diagnoses and all orders for this visit:    Routine general medical examination at a health care facility  -     TSH; Future  -     T4, Free; Future  -     Lipid Panel; Future  -     Hemoglobin A1C; Future  -     Comprehensive Metabolic Panel; Future  -     CBC Auto Differential; Future  -     Insulin, Random; Future    Medical marijuana use    NIKITA (generalized anxiety disorder)    Abnormal weight gain  -     TSH; Future  -     T4, Free; Future  -     Hemoglobin A1C; Future  -     Insulin, Random; Future  -     Ambulatory referral/consult to [facility]; Future    BMI 33.0-33.9,adult  -     Ambulatory referral/consult to [facility]; Future      NIKITA is improved since last visit. Pt now using medical marijuana to help with insomnia.  Instructed to continue with medications as prescribed.    Abnormal weight gain and elevated BMI are new problems. [facility] with [facility].   Lab work ordered to be completed in 2 days. Based on lab results, will determine if pt can start on low dose appetite suppressant. Discussed diet changes, exercise, and lifestyle issues.   Nikita- cont with buspar and celexa.   On ocps.   Instructed to f/u in 1 year.     Patient Instructions   Jennifer Ventura- nutritionist with [facility] has Podcasts of "Fueled".   Free!   Several on intermittent fasting, ketogenic diets, supplements, sugar replacements, apple cidar vinegar as well.   Also cookbooks and blogs on nutrition. Eat Fit BR and ADEBAYO with jose. " "  [email or web address]    [email or web address]    [email or web address]    Get in Touch  Email: [email or web address]  Twitter: [email or web address]  Facebook: [email or web address]  Instagram: [email or web address]  Pinterest: Teena Ventura RD    [employer] plan ( new [facility] plan)  [employer] [facility] : [email or web address]  Cell: [phone]  5:1 plan        Follow up in about 1 year (around 11/1/2024) for physical with Dr ANTHONY.      LABS:   No results found for: "HGBA1C"   Lab Results   Component Value Date    CHOL 215 (H) 12/22/2022    CHOL 197 04/22/2020    CHOL 198 11/15/2018     Lab Results   Component Value Date    LDLCALC 121.2 12/22/2022    LDLCALC 107.2 04/22/2020    LDLCALC 101.2 11/15/2018     Lab Results   Component Value Date    WBC 4.74 12/22/2022    HGB 13.8 12/22/2022    HCT 41.9 12/22/2022     12/22/2022    CHOL 215 (H) 12/22/2022    TRIG 99 12/22/2022    HDL 74 12/22/2022    ALT 27 12/22/2022    AST 22 12/22/2022     12/22/2022    K 4.6 12/22/2022     12/22/2022    CREATININE 1.1 12/22/2022    BUN 13 12/22/2022    CO2 23 12/22/2022    TSH 1.076 12/22/2022     Scribe Attestation:   I, Petar Randolph, am scribing for, and in the presence of, Dr. Madhuri Anthony MD. I performed the above scribed service and the documentation accurately describes the services I performed. I attest to the accuracy of the note.    I, Dr. Madhuri Anthony MD, reviewed documentation as scribed above. I personally performed the services described in this documentation.  I agree that the record reflects my personal performance and is accurate and complete. Madhuri Anthony MD.    11/01/2023      "

## 2023-11-01 NOTE — PATIENT INSTRUCTIONS
"Teena Ventura- nutritionist with [facility] has Podcasts of "Fueled".   Free!   Several on intermittent fasting, ketogenic diets, supplements, sugar replacements, apple cidar vinegar as well.   Also cookbooks and blogs on nutrition. Eat Fit BR and ADEBAYO with jose.   [email or web address]    [email or web address]    [email or web address]    Get in Touch  Email: [email or web address]  Twitter: [email or web address]  Facebook: [email or web address]  Instagram: [email or web address]  Pinterest: RENETTA G[employer] plan ( new [facility] plan)  [facility] : [email or web address]  Cell: [phone]  5:1 plan      "

## 2023-11-03 ENCOUNTER — LAB VISIT (OUTPATIENT)
Dept: LAB | Facility: HOSPITAL | Age: 28
End: 2023-11-03
Attending: FAMILY MEDICINE
Payer: COMMERCIAL

## 2023-11-03 DIAGNOSIS — R63.5 ABNORMAL WEIGHT GAIN: ICD-10-CM

## 2023-11-03 DIAGNOSIS — Z00.00 ROUTINE GENERAL MEDICAL EXAMINATION AT A HEALTH CARE FACILITY: ICD-10-CM

## 2023-11-03 LAB
ALBUMIN SERPL BCP-MCNC: 4 G/DL (ref 3.5–5.2)
ALP SERPL-CCNC: 68 U/L (ref 55–135)
ALT SERPL W/O P-5'-P-CCNC: 19 U/L (ref 10–44)
ANION GAP SERPL CALC-SCNC: 15 MMOL/L (ref 8–16)
AST SERPL-CCNC: 22 U/L (ref 10–40)
BASOPHILS # BLD AUTO: 0.01 K/UL (ref 0–0.2)
BASOPHILS NFR BLD: 0.2 % (ref 0–1.9)
BILIRUB SERPL-MCNC: 0.6 MG/DL (ref 0.1–1)
BUN SERPL-MCNC: 19 MG/DL (ref 6–20)
CALCIUM SERPL-MCNC: 9.4 MG/DL (ref 8.7–10.5)
CHLORIDE SERPL-SCNC: 108 MMOL/L (ref 95–110)
CHOLEST SERPL-MCNC: 239 MG/DL (ref 120–199)
CHOLEST/HDLC SERPL: 3.2 {RATIO} (ref 2–5)
CO2 SERPL-SCNC: 19 MMOL/L (ref 23–29)
CREAT SERPL-MCNC: 0.9 MG/DL (ref 0.5–1.4)
DIFFERENTIAL METHOD: NORMAL
EOSINOPHIL # BLD AUTO: 0.1 K/UL (ref 0–0.5)
EOSINOPHIL NFR BLD: 2.4 % (ref 0–8)
ERYTHROCYTE [DISTWIDTH] IN BLOOD BY AUTOMATED COUNT: 11.6 % (ref 11.5–14.5)
EST. GFR  (NO RACE VARIABLE): >60 ML/MIN/1.73 M^2
ESTIMATED AVG GLUCOSE: 100 MG/DL (ref 68–131)
GLUCOSE SERPL-MCNC: 78 MG/DL (ref 70–110)
HBA1C MFR BLD: 5.1 % (ref 4–5.6)
HCT VFR BLD AUTO: 40.6 % (ref 37–48.5)
HDLC SERPL-MCNC: 74 MG/DL (ref 40–75)
HDLC SERPL: 31 % (ref 20–50)
HGB BLD-MCNC: 13.4 G/DL (ref 12–16)
IMM GRANULOCYTES # BLD AUTO: 0.01 K/UL (ref 0–0.04)
IMM GRANULOCYTES NFR BLD AUTO: 0.2 % (ref 0–0.5)
INSULIN COLLECTION INTERVAL: NORMAL
INSULIN SERPL-ACNC: 3 UU/ML
LDLC SERPL CALC-MCNC: 146 MG/DL (ref 63–159)
LYMPHOCYTES # BLD AUTO: 1.9 K/UL (ref 1–4.8)
LYMPHOCYTES NFR BLD: 44.9 % (ref 18–48)
MCH RBC QN AUTO: 30.9 PG (ref 27–31)
MCHC RBC AUTO-ENTMCNC: 33 G/DL (ref 32–36)
MCV RBC AUTO: 94 FL (ref 82–98)
MONOCYTES # BLD AUTO: 0.4 K/UL (ref 0.3–1)
MONOCYTES NFR BLD: 9.2 % (ref 4–15)
NEUTROPHILS # BLD AUTO: 1.8 K/UL (ref 1.8–7.7)
NEUTROPHILS NFR BLD: 43.1 % (ref 38–73)
NONHDLC SERPL-MCNC: 165 MG/DL
NRBC BLD-RTO: 0 /100 WBC
PLATELET # BLD AUTO: 276 K/UL (ref 150–450)
PMV BLD AUTO: 10.6 FL (ref 9.2–12.9)
POTASSIUM SERPL-SCNC: 4.4 MMOL/L (ref 3.5–5.1)
PROT SERPL-MCNC: 6.9 G/DL (ref 6–8.4)
RBC # BLD AUTO: 4.33 M/UL (ref 4–5.4)
SODIUM SERPL-SCNC: 142 MMOL/L (ref 136–145)
T4 FREE SERPL-MCNC: 0.89 NG/DL (ref 0.71–1.51)
TRIGL SERPL-MCNC: 95 MG/DL (ref 30–150)
TSH SERPL DL<=0.005 MIU/L-ACNC: 1.04 UIU/ML (ref 0.4–4)
WBC # BLD AUTO: 4.23 K/UL (ref 3.9–12.7)

## 2023-11-03 PROCEDURE — 83036 HEMOGLOBIN GLYCOSYLATED A1C: CPT | Performed by: FAMILY MEDICINE

## 2023-11-03 PROCEDURE — 85025 COMPLETE CBC W/AUTO DIFF WBC: CPT | Performed by: FAMILY MEDICINE

## 2023-11-03 PROCEDURE — 80061 LIPID PANEL: CPT | Performed by: FAMILY MEDICINE

## 2023-11-03 PROCEDURE — 84439 ASSAY OF FREE THYROXINE: CPT | Performed by: FAMILY MEDICINE

## 2023-11-03 PROCEDURE — 84443 ASSAY THYROID STIM HORMONE: CPT | Performed by: FAMILY MEDICINE

## 2023-11-03 PROCEDURE — 36415 COLL VENOUS BLD VENIPUNCTURE: CPT | Mod: PN | Performed by: FAMILY MEDICINE

## 2023-11-03 PROCEDURE — 83525 ASSAY OF INSULIN: CPT | Performed by: FAMILY MEDICINE

## 2023-11-03 PROCEDURE — 80053 COMPREHEN METABOLIC PANEL: CPT | Performed by: FAMILY MEDICINE

## 2024-01-12 NOTE — TELEPHONE ENCOUNTER
No care due was identified.  [facility] Embedded Care Due Messages. Reference number: [id number].   1/11/2024 8:08:12 PM CST

## 2024-01-16 RX ORDER — CITALOPRAM 20 MG/1
20 TABLET, FILM COATED ORAL DAILY
Qty: 30 TABLET | Refills: 11 | Status: SHIPPED | OUTPATIENT
Start: 2024-01-16

## 2024-01-24 RX ORDER — BUSPIRONE HYDROCHLORIDE 10 MG/1
10 TABLET ORAL 2 TIMES DAILY
Qty: 180 TABLET | Refills: 1 | Status: SHIPPED | OUTPATIENT
Start: 2024-01-24

## 2024-01-24 NOTE — TELEPHONE ENCOUNTER
No care due was identified.  [facility] Embedded Care Due Messages. Reference number: [id number].   1/23/2024 9:35:43 PM CST  
Stable

## 2024-03-25 ENCOUNTER — OFFICE VISIT (OUTPATIENT)
Dept: URGENT CARE | Facility: CLINIC | Age: 29
End: 2024-03-25
Payer: COMMERCIAL

## 2024-03-25 VITALS
DIASTOLIC BLOOD PRESSURE: 72 MMHG | TEMPERATURE: 99 F | BODY MASS INDEX: 33.86 KG/M2 | HEIGHT: 68 IN | HEART RATE: 66 BPM | OXYGEN SATURATION: 99 % | RESPIRATION RATE: 18 BRPM | SYSTOLIC BLOOD PRESSURE: 115 MMHG

## 2024-03-25 DIAGNOSIS — R09.81 SINUS CONGESTION: ICD-10-CM

## 2024-03-25 DIAGNOSIS — B97.89 ACUTE VIRAL SINUSITIS: Primary | ICD-10-CM

## 2024-03-25 DIAGNOSIS — J01.90 ACUTE VIRAL SINUSITIS: Primary | ICD-10-CM

## 2024-03-25 DIAGNOSIS — J30.9 ALLERGIC RHINITIS WITH POSTNASAL DRIP: ICD-10-CM

## 2024-03-25 DIAGNOSIS — R09.82 ALLERGIC RHINITIS WITH POSTNASAL DRIP: ICD-10-CM

## 2024-03-25 LAB
CTP QC/QA: YES
SARS-COV-2 AG RESP QL IA.RAPID: NEGATIVE

## 2024-03-25 PROCEDURE — 87811 SARS-COV-2 COVID19 W/OPTIC: CPT | Mod: QW,S$GLB,, | Performed by: NURSE PRACTITIONER

## 2024-03-25 PROCEDURE — 99214 OFFICE O/P EST MOD 30 MIN: CPT | Mod: S$GLB,,, | Performed by: NURSE PRACTITIONER

## 2024-03-25 RX ORDER — FLUTICASONE PROPIONATE 50 MCG
1 SPRAY, SUSPENSION (ML) NASAL DAILY
Qty: 16 ML | Refills: 0 | Status: SHIPPED | OUTPATIENT
Start: 2024-03-25

## 2024-03-25 RX ORDER — PREDNISONE 20 MG/1
20 TABLET ORAL DAILY
Qty: 3 TABLET | Refills: 0 | Status: SHIPPED | OUTPATIENT
Start: 2024-03-25 | End: 2024-03-28

## 2024-03-25 RX ORDER — LEVOCETIRIZINE DIHYDROCHLORIDE 5 MG/1
5 TABLET, FILM COATED ORAL NIGHTLY
Qty: 30 TABLET | Refills: 0 | Status: SHIPPED | OUTPATIENT
Start: 2024-03-25 | End: 2024-05-23

## 2024-03-25 NOTE — PROGRESS NOTES
"Subjective:      Patient ID: Weston Zuleta is a 28 y.o. female.    Vitals:  height is 5' 8" (1.727 m). Her oral temperature is 98.5 °F (36.9 °C). Her blood pressure is 115/72 and her pulse is 66. Her respiration is 18 and oxygen saturation is 99%.     Chief Complaint: Sinus Problem    Weston Zuleta is a 28 year old female whom presents to urgent care for evaluation of  sinus congestion, sore throat, sinus pressure, hoarseness, headache. On set of symptoms Friday. OTC - day quil, nyquil with minimal relief. 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     Sinus Problem  This is a new problem. The current episode started in the past 7 days (Friday 03/22/2024). The problem has been gradually worsening since onset. The fever has been present for Less than 1 day. Her pain is at a severity of 0/10. She is experiencing no pain. Associated symptoms include congestion, headaches, sinus pressure and sneezing. Pertinent negatives include no chills, coughing, diaphoresis, ear pain, hoarse voice, neck pain, shortness of breath, sore throat or swollen glands. Treatments tried: Night quil and day quil. The treatment provided no relief.       Constitution: Negative for chills and sweating.   HENT:  Positive for congestion and sinus pressure. Negative for ear pain and sore throat.    Neck: Negative for neck pain.   Respiratory:  Negative for cough and shortness of breath.    Allergic/Immunologic: Positive for sneezing.   Neurological:  Positive for headaches.      Objective:     Physical Exam   Constitutional: She is oriented to person, place, and time. She appears well-developed. She is cooperative.   HENT:   Head: Normocephalic and atraumatic.   Ears:   Right Ear: Hearing, tympanic membrane, external ear and ear canal normal.   Left Ear: Hearing, tympanic membrane, external ear and ear canal normal.   Nose: Congestion present. No mucosal edema or nasal deformity. No epistaxis. Right " sinus exhibits no maxillary sinus tenderness and no frontal sinus tenderness. Left sinus exhibits no maxillary sinus tenderness and no frontal sinus tenderness.   Mouth/Throat: Uvula is midline and mucous membranes are normal. Mucous membranes are moist. No trismus in the jaw. Normal dentition. No uvula swelling. Posterior oropharyngeal erythema present. Oropharynx is clear.      Comments: +PND  Eyes: Conjunctivae and lids are normal. Pupils are equal, round, and reactive to light. Extraocular movement intact   Neck: Trachea normal and phonation normal. Neck supple.      Comments: Hoarseness noted   Cardiovascular: Normal rate, regular rhythm, normal heart sounds and normal pulses.   Pulmonary/Chest: Effort normal and breath sounds normal.   Abdominal: Normal appearance and bowel sounds are normal. Soft.   Musculoskeletal: Normal range of motion.         General: Normal range of motion.   Neurological: She is alert and oriented to person, place, and time. She exhibits normal muscle tone.   Skin: Skin is warm, dry and intact.   Psychiatric: Her speech is normal and behavior is normal. Judgment and thought content normal.   Nursing note and vitals reviewed.    Results for orders placed or performed in visit on 03/25/24   SARS Coronavirus 2 Antigen, POCT Manual Read   Result Value Ref Range    SARS Coronavirus 2 Antigen Negative Negative     Acceptable Yes        Assessment:     1. Acute viral sinusitis    2. Sinus congestion    3. Allergic rhinitis with postnasal drip        Plan:       Acute viral sinusitis  -     fluticasone propionate (FLONASE) 50 mcg/actuation nasal spray; 1 spray (50 mcg total) by Each Nostril route once daily.  Dispense: 16 mL; Refill: 0  -     predniSONE (DELTASONE) 20 MG tablet; Take 1 tablet (20 mg total) by mouth once daily. for 3 days  Dispense: 3 tablet; Refill: 0  -     levocetirizine (XYZAL) 5 MG tablet; Take 1 tablet (5 mg total) by mouth every evening.  Dispense: 30  tablet; Refill: 0    Sinus congestion  -     SARS Coronavirus 2 Antigen, POCT Manual Read  -     fluticasone propionate (FLONASE) 50 mcg/actuation nasal spray; 1 spray (50 mcg total) by Each Nostril route once daily.  Dispense: 16 mL; Refill: 0  -     predniSONE (DELTASONE) 20 MG tablet; Take 1 tablet (20 mg total) by mouth once daily. for 3 days  Dispense: 3 tablet; Refill: 0  -     levocetirizine (XYZAL) 5 MG tablet; Take 1 tablet (5 mg total) by mouth every evening.  Dispense: 30 tablet; Refill: 0    Allergic rhinitis with postnasal drip  -     fluticasone propionate (FLONASE) 50 mcg/actuation nasal spray; 1 spray (50 mcg total) by Each Nostril route once daily.  Dispense: 16 mL; Refill: 0  -     predniSONE (DELTASONE) 20 MG tablet; Take 1 tablet (20 mg total) by mouth once daily. for 3 days  Dispense: 3 tablet; Refill: 0  -     levocetirizine (XYZAL) 5 MG tablet; Take 1 tablet (5 mg total) by mouth every evening.  Dispense: 30 tablet; Refill: 0          Medical Decision Making:   Differential Diagnosis:   Bronchitis, COPD/Asthma exacerbation, Viral upper respiratory infection, Bacterial upper respiratory infection, Pneumonia, covid19, influenza,bacterial vs viral sinusitis.     Clinical Tests:   Lab Tests: Ordered and Reviewed       <> Summary of Lab: Covid negative  Urgent Care Management:    Symptomatic treatment highly encouraged. Increase oral intake of fluids to help thin mucosal secretions. Additional plan of care as outline above.  ER/follow up protocol discussed. Patient educated on viral vs bacterial causes of sinusitis. All of the patients questions were answered The patient verbalized understanding and agrees with the discussed plan of care. Patient remained stable and was discharged in no acute distress.            Patient Instructions   You have tested negative for COVID on our Binax test. As a follow up the recommendation is if you have symptoms within the first 7 days to retest within 48 hours. If  you have NO SYMPTONS then you should test every 48 hours two more times.    PLEASE READ YOUR DISCHARGE INSTRUCTIONS ENTIRELY AS IT CONTAINS IMPORTANT INFORMATION.    Please drink plenty of fluids.    Please get plenty of rest.    Please go to the Emergency Department for any concerns or worsening of condition.    Please take an over the counter antihistamine medication (Allegra/Claritin/Zyrtec/xyzal) of your choice as directed for allergy symptoms and/or runny nose and postnasal drip.    Try an over the counter decongestant for sinus pressure/ear pressure, congestion symptoms like Mucinex D or Sudafed or Phenylephrine. You buy this behind the pharmacy counter.    If you do have Hypertension or palpitations, it is safe to take Coricidin HBP for relief of sinus symptoms.    Certain OTC cough and cold medications may raise your blood pressure. To keep your blood pressure regulated avoid over-the-counter decongestants and multisymptom cold remedies that contain decongestants -- such as pseudoephedrine, ephedrine, phenylephrine, naphazoline and oxymetazoline. Also, check the label for high sodium content, which can also raise blood pressure.       Tylenol or ibuprofen can also be used as directed for pain and fever unless you have an allergy to them or medical condition such as stomach ulcers, kidney or liver disease or blood thinners etc for which you should not be taking these type of medications.     SORE THROAT:    You may gargle with hot salt water 4 times a day for the next 2 days and then you may also gargle diluted hydrogen peroxide once to twice daily to alleviate some of your throat discomfort.  Drink plenty of fluids, recommend warm tea with honey.     YOU MAY USE OVER-THE-COUNTER CEPACOL FOR SOOTHING OF YOUR THROAT.  You may wish to avoid spicy food, citrus fruits, and red sauces- as this may irritate the throat more.        Use over the counter Flonase or Nasocort: one spray each nostril twice daily OR two  "sprays each nostril once daily until nares dry out, unless you have Glaucoma.   Can also supplement with nasal saline rinse.    Sinus rinses DO NOT USE TAP WATER, if you must, water must be at a rolling boil for 1 minute, let it cool, then use.  May use distilled water, or over the counter nasal saline rinses.  Wayne's vapor rub in shower to help open nasal passages.  May use nasal gel to keep passages moisturized.  May use nasal saline sprays during the day for added relief of congestion.   For those who go to the gym, please do not use the sauna or steam room now to clear sinuses.    Cough     Rest and fluids are important  Can use honey with chucky to soothe your throat    Robitussin or Delsyum for cough suppressant for dry cough.    Mucinex DM or products containing Guaifenesin or Dextromethorphan for expectorant (wet cough).  -  If you have hypertension avoid using the "D" which is the decongestant.  Instead you can use Coricidin HBP for cold and cough symptoms.      Please follow up with your primary care doctor or specialist in the next 48-72hrs as needed and if no improvement    If you smoke, please stop smoking.    Lastly, good hand washing and cough hygiene (cough into your elbow) will help prevent the spread of the illness. A general rule is that you are no longer contagious once you have been without a fever for over 24 hours without requiring fever reducing medications.     Please arrange follow up with your primary medical clinic as soon as possible. You must understand that you've received an Urgent Care treatment only and that you may be released before all of your medical problems are known or treated. You, the patient, will arrange for follow up as instructed. If your symptoms worsen or fail to improve you should go to the Emergency Room.      "

## 2024-03-25 NOTE — PATIENT INSTRUCTIONS
You have tested negative for COVID on our Binax test. As a follow up the recommendation is if you have symptoms within the first 7 days to retest within 48 hours. If you have NO SYMPTONS then you should test every 48 hours two more times.    PLEASE READ YOUR DISCHARGE INSTRUCTIONS ENTIRELY AS IT CONTAINS IMPORTANT INFORMATION.    Please drink plenty of fluids.    Please get plenty of rest.    Please go to the Emergency Department for any concerns or worsening of condition.    Please take an over the counter antihistamine medication (Allegra/Claritin/Zyrtec/xyzal) of your choice as directed for allergy symptoms and/or runny nose and postnasal drip.    Try an over the counter decongestant for sinus pressure/ear pressure, congestion symptoms like Mucinex D or Sudafed or Phenylephrine. You buy this behind the pharmacy counter.    If you do have Hypertension or palpitations, it is safe to take Coricidin HBP for relief of sinus symptoms.    Certain OTC cough and cold medications may raise your blood pressure. To keep your blood pressure regulated avoid over-the-counter decongestants and multisymptom cold remedies that contain decongestants -- such as pseudoephedrine, ephedrine, phenylephrine, naphazoline and oxymetazoline. Also, check the label for high sodium content, which can also raise blood pressure.       Tylenol or ibuprofen can also be used as directed for pain and fever unless you have an allergy to them or medical condition such as stomach ulcers, kidney or liver disease or blood thinners etc for which you should not be taking these type of medications.     SORE THROAT:    You may gargle with hot salt water 4 times a day for the next 2 days and then you may also gargle diluted hydrogen peroxide once to twice daily to alleviate some of your throat discomfort.  Drink plenty of fluids, recommend warm tea with honey.     YOU MAY USE OVER-THE-COUNTER CEPACOL FOR SOOTHING OF YOUR THROAT.  You may wish to avoid spicy  "food, citrus fruits, and red sauces- as this may irritate the throat more.        Use over the counter Flonase or Nasocort: one spray each nostril twice daily OR two sprays each nostril once daily until nares dry out, unless you have Glaucoma.   Can also supplement with nasal saline rinse.    Sinus rinses DO NOT USE TAP WATER, if you must, water must be at a rolling boil for 1 minute, let it cool, then use.  May use distilled water, or over the counter nasal saline rinses.  Wayne's vapor rub in shower to help open nasal passages.  May use nasal gel to keep passages moisturized.  May use nasal saline sprays during the day for added relief of congestion.   For those who go to the gym, please do not use the sauna or steam room now to clear sinuses.    Cough     Rest and fluids are important  Can use honey with chucky to soothe your throat    Robitussin or Delsyum for cough suppressant for dry cough.    Mucinex DM or products containing Guaifenesin or Dextromethorphan for expectorant (wet cough).  -  If you have hypertension avoid using the "D" which is the decongestant.  Instead you can use Coricidin HBP for cold and cough symptoms.      Please follow up with your primary care doctor or specialist in the next 48-72hrs as needed and if no improvement    If you smoke, please stop smoking.    Lastly, good hand washing and cough hygiene (cough into your elbow) will help prevent the spread of the illness. A general rule is that you are no longer contagious once you have been without a fever for over 24 hours without requiring fever reducing medications.     Please arrange follow up with your primary medical clinic as soon as possible. You must understand that you've received an Urgent Care treatment only and that you may be released before all of your medical problems are known or treated. You, the patient, will arrange for follow up as instructed. If your symptoms worsen or fail to improve you should go to the Emergency " Room.

## 2024-04-25 ENCOUNTER — PATIENT MESSAGE (OUTPATIENT)
Dept: PRIMARY CARE CLINIC | Facility: CLINIC | Age: 29
End: 2024-04-25
Payer: COMMERCIAL

## 2024-04-25 ENCOUNTER — TELEPHONE (OUTPATIENT)
Dept: PRIMARY CARE CLINIC | Facility: CLINIC | Age: 29
End: 2024-04-25
Payer: COMMERCIAL

## 2024-04-25 NOTE — TELEPHONE ENCOUNTER
Contacted pt regarding recent scheduling changes. Pt verbalized understanding and declined other options. Current appointment has been cancelled.

## 2024-05-13 ENCOUNTER — PATIENT MESSAGE (OUTPATIENT)
Dept: PRIMARY CARE CLINIC | Facility: CLINIC | Age: 29
End: 2024-05-13
Payer: COMMERCIAL

## 2024-05-13 NOTE — TELEPHONE ENCOUNTER
I have signed for the following orders AND/OR meds.  Please call the patient and ask the patient to schedule the testing AND/OR inform about any medications that were sent.      Orders Placed This Encounter   Procedures    Ambulatory referral/consult to Endocrinology     Standing Status:   Future     Standing Expiration Date:   6/13/2025     Referral Priority:   Routine     Referral Type:   Consultation     Requested Specialty:   Endocrinology     Number of Visits Requested:   1        Please fax to  Dr. Merritt

## 2024-10-03 RX ORDER — BUSPIRONE HYDROCHLORIDE 10 MG/1
10 TABLET ORAL 2 TIMES DAILY
Qty: 60 TABLET | Refills: 0 | Status: SHIPPED | OUTPATIENT
Start: 2024-10-03

## 2024-10-03 NOTE — TELEPHONE ENCOUNTER
No care due was identified.  [facility] Embedded Care Due Messages. Reference number: [id number].   10/03/2024 7:02:44 AM CDT

## 2024-10-30 ENCOUNTER — OFFICE VISIT (OUTPATIENT)
Dept: URGENT CARE | Facility: CLINIC | Age: 29
End: 2024-10-30
Payer: COMMERCIAL

## 2024-10-30 VITALS
SYSTOLIC BLOOD PRESSURE: 107 MMHG | BODY MASS INDEX: 30.31 KG/M2 | TEMPERATURE: 98 F | RESPIRATION RATE: 20 BRPM | HEART RATE: 83 BPM | WEIGHT: 200 LBS | OXYGEN SATURATION: 97 % | DIASTOLIC BLOOD PRESSURE: 74 MMHG | HEIGHT: 68 IN

## 2024-10-30 DIAGNOSIS — J02.8 SORE THROAT (VIRAL): Primary | ICD-10-CM

## 2024-10-30 DIAGNOSIS — B97.89 SORE THROAT (VIRAL): Primary | ICD-10-CM

## 2024-10-30 DIAGNOSIS — R51.9 SINUS HEADACHE: ICD-10-CM

## 2024-10-30 DIAGNOSIS — J06.9 VIRAL URI WITH COUGH: ICD-10-CM

## 2024-10-30 LAB
CTP QC/QA: YES
SARS-COV-2 AG RESP QL IA.RAPID: NEGATIVE

## 2024-10-30 PROCEDURE — 99214 OFFICE O/P EST MOD 30 MIN: CPT | Mod: S$GLB,,, | Performed by: PHYSICIAN ASSISTANT

## 2024-10-30 PROCEDURE — 87811 SARS-COV-2 COVID19 W/OPTIC: CPT | Mod: QW,S$GLB,, | Performed by: PHYSICIAN ASSISTANT

## 2024-10-30 RX ORDER — BUSPIRONE HYDROCHLORIDE 10 MG/1
10 TABLET ORAL 2 TIMES DAILY
Qty: 60 TABLET | Refills: 0 | Status: SHIPPED | OUTPATIENT
Start: 2024-10-30

## 2024-10-30 RX ORDER — METFORMIN HYDROCHLORIDE 500 MG/1
500 TABLET, EXTENDED RELEASE ORAL
COMMUNITY

## 2024-11-04 ENCOUNTER — PATIENT MESSAGE (OUTPATIENT)
Dept: PRIMARY CARE CLINIC | Facility: CLINIC | Age: 29
End: 2024-11-04

## 2024-11-04 ENCOUNTER — OFFICE VISIT (OUTPATIENT)
Dept: PRIMARY CARE CLINIC | Facility: CLINIC | Age: 29
End: 2024-11-04
Payer: COMMERCIAL

## 2024-11-04 VITALS
SYSTOLIC BLOOD PRESSURE: 110 MMHG | WEIGHT: 214.94 LBS | HEART RATE: 64 BPM | DIASTOLIC BLOOD PRESSURE: 78 MMHG | OXYGEN SATURATION: 98 % | TEMPERATURE: 98 F | BODY MASS INDEX: 32.68 KG/M2

## 2024-11-04 DIAGNOSIS — Z00.00 ROUTINE GENERAL MEDICAL EXAMINATION AT A HEALTH CARE FACILITY: Primary | ICD-10-CM

## 2024-11-04 DIAGNOSIS — F41.1 GAD (GENERALIZED ANXIETY DISORDER): ICD-10-CM

## 2024-11-04 PROBLEM — E66.811 CLASS 1 OBESITY DUE TO EXCESS CALORIES WITH SERIOUS COMORBIDITY AND BODY MASS INDEX (BMI) OF 32.0 TO 32.9 IN ADULT: Status: ACTIVE | Noted: 2024-07-18

## 2024-11-04 PROBLEM — E88.810 METABOLIC SYNDROME X: Status: ACTIVE | Noted: 2024-07-18

## 2024-11-04 PROBLEM — E78.2 MIXED HYPERLIPIDEMIA: Status: ACTIVE | Noted: 2024-07-18

## 2024-11-04 PROBLEM — E66.09 CLASS 1 OBESITY DUE TO EXCESS CALORIES WITH SERIOUS COMORBIDITY AND BODY MASS INDEX (BMI) OF 32.0 TO 32.9 IN ADULT: Status: ACTIVE | Noted: 2024-07-18

## 2024-11-04 PROCEDURE — 1160F RVW MEDS BY RX/DR IN RCRD: CPT | Mod: CPTII,S$GLB,, | Performed by: FAMILY MEDICINE

## 2024-11-04 PROCEDURE — 3074F SYST BP LT 130 MM HG: CPT | Mod: CPTII,S$GLB,, | Performed by: FAMILY MEDICINE

## 2024-11-04 PROCEDURE — 99999 PR PBB SHADOW E&M-EST. PATIENT-LVL III: CPT | Mod: PBBFAC,,, | Performed by: FAMILY MEDICINE

## 2024-11-04 PROCEDURE — 3078F DIAST BP <80 MM HG: CPT | Mod: CPTII,S$GLB,, | Performed by: FAMILY MEDICINE

## 2024-11-04 PROCEDURE — 99395 PREV VISIT EST AGE 18-39: CPT | Mod: S$GLB,,, | Performed by: FAMILY MEDICINE

## 2024-11-04 PROCEDURE — 3008F BODY MASS INDEX DOCD: CPT | Mod: CPTII,S$GLB,, | Performed by: FAMILY MEDICINE

## 2024-11-04 PROCEDURE — 1159F MED LIST DOCD IN RCRD: CPT | Mod: CPTII,S$GLB,, | Performed by: FAMILY MEDICINE

## 2024-11-04 RX ORDER — SEMAGLUTIDE 1.34 MG/ML
0.5 INJECTION, SOLUTION SUBCUTANEOUS
COMMUNITY
Start: 2024-08-12

## 2024-11-04 NOTE — PATIENT INSTRUCTIONS
Local Compounding Weight loss Clinics:   Compounded Tirzepatide and Semaglutide.     In  [place] :     [facility]   [email or web address]  ------------------------------------------------------------------------------------  [facility]  [address]  [email or web address]  [phone]    FEES  Initial Consultation (1st Visit): $100  Follow-Up Visits: $25  Semaglutide- 0.5-2mg mg/wk (5 doses): $145-$270  Tirzepatide 2.5mg -15mg/wk (4 doses) : $195 -$535  -------------------------------------------------------------------------------------

## 2024-11-04 NOTE — PROGRESS NOTES
Chief Complaint  Chief Complaint   Patient presents with    Annual Exam       HPI  Weston Zuleta is a 29 y.o. female with multiple medical diagnoses as listed in the medical history and problem list that presents for  in person visit.     History of Present Illness    CHIEF COMPLAINT:  - Patient presents for an annual exam and to discuss weight management and metabolic issues.    HPI:  Patient presents for annual exam and follow-up on weight management. Patient was referred to Dr. Karmen Gandhi at [facility] for metabolic syndrome and obesity. Dr. Gandhi diagnosed insulin resistance based on labs showing abnormal glucose tolerance test results. Patient started metformin in July.    Patient reports losing approximately 10 lbs before starting metformin, with weight decreasing from 222 lbs last year to 214 lbs today (with shoes on). Patient feels stagnant in weight loss efforts and expresses frustration at lack of progress in weight change or energy levels, despite consistent running, working out, and strength training.    Patient reports persistent fatigue and no significant changes in energy levels since starting metformin or taking supplements recommended by Dr. Gandhi, including B12 and other vitamins. Patient acknowledges difficulty with meal preparation and potential for improvement in eating habits.    Patient reports BuSpar and Celexa are effective. Patient has received flu vaccine this year but has not had a COVID vaccine since the start of the pandemic.    Regarding family planning, patient and partner are considering conception over the summer next year. Patient expresses desire to lose weight before pregnancy to improve overall well-being.    Patient has medical marijuana prescription but reports infrequent use, noting it sometimes aids sleep but can increase appetite.    Patient denies any significant improvements or changes since starting metformin. Patient denies having diabetes or  impaired glucose tolerance.    MEDICATIONS:  - Metformin, started in July, for insulin resistance  - BuSpar, for anxiety  - Celexa, for anxiety/depression  - B12 supplement  - Birth control pills  - Other vitamin supplements    PMH:  - Moderate insulin resistance.  Metabolic syndrome.  Obesity.  - Contraception: current birth control pills.  Pregnancy status: Denies possibility of pregnancy.    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         Ohs Peq Sdoh    11/4/2024  2:29 PM CST - Filed by Patient   This questionnaire should take approximately 5 to 10 minutes to complete.  To begin, press Let's Begin and then press Continue. Let's Begin   On average, how many days per week do you engage in moderate to strenuous exercise (like a brisk walk)? 4 days   On average, how many minutes do you engage in exercise at this level? 40 min   Do you feel stress - tense, restless, nervous, or anxious, or unable to sleep at night because your mind is troubled all the time - these days? Only a little   How often do you feel lonely or isolated from those around you? Never   How often do you need to have someone help you when you read instructions, pamphlets, or other written material from your doctor or pharmacy? Never   How hard is it for you to pay for the very basics like food, housing, medical care, and heating? Not hard at all   In the past 12 months has the electric, gas, oil, or water company threatened to shut off services in your home? No   Within the past 12 months, you worried that your food would run out before you got the money to buy more. Never true   Within the past 12 months, the food you bought just didn't last and you didn't have money to get more. Never true   Has the lack of transportation kept you from medical appointments, meetings, work or from getting  things needed for daily living? No   In the last 12 months, was there a time when you were not able to pay the mortgage or rent on time? No   In the last 12 months, was there a time when you did not have a steady place to sleep or slept in a shelter (including now)? No   How often do you have a drink containing alcohol? Monthly or less   How many drinks containing alcohol do you have on a typical day when you are drinking? 3 or 4   How often do you have six or more drinks on one occasion? Less than monthly            Pmh, Psh, Family Hx, Social Hx, HM updated in Epic Tabs today.    Review of Systems     Objective:     Vitals:    11/04/24 1503   BP: 110/78   Pulse: 64   Temp: 98 °F (36.7 °C)   SpO2: 98%   Weight: 97.5 kg (214 lb 15.2 oz)     Wt Readings from Last 10 Encounters:   11/04/24 97.5 kg (214 lb 15.2 oz)   10/30/24 90.7 kg (200 lb)   05/21/24 96.2 kg (212 lb)   11/01/23 101 kg (222 lb 10.6 oz)   10/18/23 99.7 kg (219 lb 11.2 oz)   05/11/23 93.9 kg (207 lb)   12/27/22 92.3 kg (203 lb 6 oz)   11/08/22 83.9 kg (185 lb)   09/12/22 83.9 kg (185 lb)   08/29/22 83.9 kg (185 lb)     Physical Exam    Vitals: Weight: 214 lbs.  TEST RESULTS:  - Blood work: July (approximate), showed evidence of moderate insulin resistance with insulin at 45 and 62 at the half hour court.  Cholesterol panel: HDL levels were high, LDL levels were low.  Glucose tolerance test: showed insulin resistance that would not have been detected on a regular fasting test.       Physical Exam    Assessment:     1. Routine general medical examination at a health care facility    2. DAVID (generalized anxiety disorder)        LABS:   Lab Results   Component Value Date    HGBA1C 5.1 11/03/2023      Lab Results   Component Value Date    CHOL 239 (H) 11/03/2023    CHOL 215 (H) 12/22/2022    CHOL 197 04/22/2020     Lab Results   Component Value Date    LDLCALC 146.0 11/03/2023    LDLCALC 121.2 12/22/2022    LDLCALC 107.2 04/22/2020     Lab Results    Component Value Date    WBC 4.23 11/03/2023    HGB 13.4 11/03/2023    HCT 40.6 11/03/2023     11/03/2023    CHOL 239 (H) 11/03/2023    TRIG 95 11/03/2023    HDL 74 11/03/2023    ALT 19 11/03/2023    AST 22 11/03/2023     11/03/2023    K 4.4 11/03/2023     11/03/2023    CREATININE 0.9 11/03/2023    BUN 19 11/03/2023    CO2 19 (L) 11/03/2023    TSH 1.044 11/03/2023    HGBA1C 5.1 11/03/2023       Plan:   Weston was seen today for annual exam.    Diagnoses and all orders for this visit:    Routine general medical examination at a health care facility    DAVID (generalized anxiety disorder)        Assessment & Plan    IMPRESSION:  - Reviewed recent metabolic workup from Dr. Gandhi, indicating moderate insulin resistance without overt diabetes  - Considered options for weight management, including semaglutide (Ozempic) or tirzepatide (Mounjaro/Zepbound) pending insurance approval  - Evaluated alternative weight loss medications (e.g., naltrexone/bupropion combination, phentermine) if GLP-1 agonists not covered  - Assessed current anxiety management with BuSpar and Celexa  - Noted patient's desire for weight loss prior to planned pregnancy attempt in summer  - Evaluated risks/benefits of weight loss medications in context of future pregnancy plans    INSULIN RESISTANCE:  - Reviewed lab results confirming moderate insulin resistance with insulin levels at 45 and 62 at the half-hour court.  - Initiated metformin treatment in July.  - Discussed treatment options including semaglutide (Ozempic) or tirzepatide (Mounjaro).  - Noted the patient has been on metformin since July with no significant changes reported.  - Discussed potential addition of semaglutide (Ozempic) or tirzepatide (Mounjaro) to current metformin regimen.  - Conducted glucose tolerance test.  - Reviewed and interpreted test results showing evidence of insulin resistance but no diabetes or impaired glucose tolerance.  - Noted family history  of diabetes (patient's father).  - Evaluated patient showing no evidence of diabetes but moderate insulin resistance.  - Initiated metformin in July for insulin resistance management.  - Discussed potential addition of semaglutide (Ozempic) or tirzepatide (Mounjaro) for improved management of insulin resistance.    METABOLIC SYNDROME:  - Referred the patient to Dr. Karmen Gandhi at [facility] for metabolic syndrome management.  - Conducted labs and scheduled an appointment with Dr. Gandhi.    HYPERLIPIDEMIA:  - Noted good cholesterol levels, with high HDL and low LDL.    WEIGHT MANAGEMENT:  - Provided information on alternative weight loss medications and compounding options if insurance does not cover Ozempic/Mounjaro.  - Acknowledged the patient's consistent exercise routine but noted struggles with meal preparation.  - Observed no significant changes in weight or energy levels despite exercise and diet efforts.  - Discussed potential for improved eating habits.    CONTRACEPTION:  - Explained potential for breakthrough bleeding on oral contraceptives when initiating GLP-1 agonists due to altered metabolism.  - Discussed the necessity of pregnancy testing if menstruation is missed while on GLP-1 agonists.  - Advised the patient to take a pregnancy test if menstruation is missed while on Ozempic/Mounjaro and oral contraceptives.  - Instructed the patient to discontinue Ozempic/Mounjaro if pregnancy occurs.  - Emphasized the importance of discontinuing GLP-1 agonists if pregnancy occurs.  - Discussed the patient's plans to try for pregnancy next summer and potential impact of weight loss medication on pregnancy planning.    MEDICATIONS/SUPPLEMENTS:  -   Recommend vitamin supplements for energy and egg production as per Dr. Gandhi's advice.  - Discussed potential side effect of constipation with GLP-1 agonists.  - Continued BuSpar and Celexa at current doses.  - Offered refills for BuSpar and Celexa.  -  Noted anxiety as a potential risk factor when considering stimulant medications for weight loss.  - Recommend supplements for egg production due to insulin resistance as per Dr. Gandhi's advice.    FOLLOW UP:  - Instructed the patient to contact the office regarding the outcome of pharmacy/insurance inquiry about GLP-1 agonist prescription.  - Advised the patient to contact the office if no update on GLP-1 agonist prescription within 1 week to discuss alternative options.         No image results found.    The ASCVD Risk score (Reid DK, et al., 2019) failed to calculate for the following reasons:    The 2019 ASCVD risk score is only valid for ages 40 to 79    Follow-up: Follow up in about 1 year (around 11/4/2025) for physical with Dr ANTHONY.    I spent a total of    30   minutes face to face and non-face to face on the date of this visit.This includes time preparing to see the patient (eg, review of tests, notes), obtaining and/or reviewing additional history from an independent historian and/or outside medical records, documenting clinical information in the electronic health record, independently interpreting results and/or communicating results to the patient/family/caregiver, or care coordinator.  Visit today included increased complexity associated with the care of the episodic problem addressed and managing the longitudinal care of the patient due to the serious and/or complex managed problem(s).    This note was generated with the assistance of ambient listening technology. Verbal consent was obtained by the patient and accompanying visitor(s) for the recording of patient appointment to facilitate this note. I attest to having reviewed and edited the generated note for accuracy, though some syntax or spelling errors may persist. Please contact the author of this note for any clarification.       Patient Instructions   Local Compounding Weight loss Clinics:   Compounded Tirzepatide and Semaglutide.     In   [place]i [place] :     [facility].   [email or web address]  ------------------------------------------------------------------------------------  [facility]  WADE Chairez  [email or web address]  [phone]    FEES  Initial Consultation (1st Visit): $100  Follow-Up Visits: $25  Semaglutide- 0.5-2mg mg/wk (5 doses): $145-$270  Tirzepatide 2.5mg -15mg/wk (4 doses) : $195 -$535  -------------------------------------------------------------------------------------

## 2024-11-24 NOTE — TELEPHONE ENCOUNTER
No care due was identified.  [facility] Embedded Care Due Messages. Reference number: [id number].   11/24/2024 8:03:24 AM CST

## 2024-11-25 RX ORDER — BUSPIRONE HYDROCHLORIDE 10 MG/1
10 TABLET ORAL 2 TIMES DAILY
Qty: 60 TABLET | Refills: 3 | Status: SHIPPED | OUTPATIENT
Start: 2024-11-25

## 2025-01-28 ENCOUNTER — OFFICE VISIT (OUTPATIENT)
Dept: URGENT CARE | Facility: CLINIC | Age: 30
End: 2025-01-28
Payer: COMMERCIAL

## 2025-01-28 VITALS
BODY MASS INDEX: 30.07 KG/M2 | RESPIRATION RATE: 16 BRPM | HEIGHT: 68 IN | SYSTOLIC BLOOD PRESSURE: 102 MMHG | WEIGHT: 198.44 LBS | TEMPERATURE: 98 F | HEART RATE: 88 BPM | DIASTOLIC BLOOD PRESSURE: 73 MMHG | OXYGEN SATURATION: 96 %

## 2025-01-28 DIAGNOSIS — J06.9 UPPER RESPIRATORY TRACT INFECTION, UNSPECIFIED TYPE: Primary | ICD-10-CM

## 2025-01-28 DIAGNOSIS — J02.9 SORE THROAT: ICD-10-CM

## 2025-01-28 LAB
CTP QC/QA: YES
MOLECULAR STREP A: NEGATIVE
POC MOLECULAR INFLUENZA A AGN: NEGATIVE
POC MOLECULAR INFLUENZA B AGN: NEGATIVE
POC RSV RAPID ANT MOLECULAR: NEGATIVE
SARS-COV-2 AG RESP QL IA.RAPID: NEGATIVE

## 2025-01-28 PROCEDURE — 87811 SARS-COV-2 COVID19 W/OPTIC: CPT | Mod: QW,S$GLB,, | Performed by: FAMILY MEDICINE

## 2025-01-28 PROCEDURE — 99214 OFFICE O/P EST MOD 30 MIN: CPT | Mod: S$GLB,,, | Performed by: FAMILY MEDICINE

## 2025-01-28 PROCEDURE — 87634 RSV DNA/RNA AMP PROBE: CPT | Mod: QW,S$GLB,, | Performed by: FAMILY MEDICINE

## 2025-01-28 PROCEDURE — 87651 STREP A DNA AMP PROBE: CPT | Mod: QW,S$GLB,, | Performed by: FAMILY MEDICINE

## 2025-01-28 PROCEDURE — 87502 INFLUENZA DNA AMP PROBE: CPT | Mod: QW,S$GLB,, | Performed by: FAMILY MEDICINE

## 2025-01-28 RX ORDER — FLUTICASONE PROPIONATE 50 MCG
2 SPRAY, SUSPENSION (ML) NASAL DAILY
Qty: 18.2 ML | Refills: 0 | Status: SHIPPED | OUTPATIENT
Start: 2025-01-28

## 2025-01-28 RX ORDER — BROMPHENIRAMINE MALEATE, PSEUDOEPHEDRINE HYDROCHLORIDE, AND DEXTROMETHORPHAN HYDROBROMIDE 2; 30; 10 MG/5ML; MG/5ML; MG/5ML
10 SYRUP ORAL EVERY 6 HOURS PRN
Qty: 160 ML | Refills: 0 | Status: SHIPPED | OUTPATIENT
Start: 2025-01-28

## 2025-01-28 NOTE — PROGRESS NOTES
"Subjective:      Patient ID: Weston Zuleta is a 29 y.o. female.    Vitals:  height is 5' 8" (1.727 m) and weight is 90 kg (198 lb 6.6 oz). Her oral temperature is 97.9 °F (36.6 °C). Her blood pressure is 102/73 and her pulse is 88. Her respiration is 16 and oxygen saturation is 96%.     Chief Complaint: Sore Throat (Sinus pressure/congestion, fatigue - Entered by patient)    30 yo female pt presents with sore throat, sinus pressure, body aches, and nasal congestion starting 1/25/25. Pt states her mother in law had the flu but that was last week.  Pt has been taking dayquil and nyquil for sxs, last taken at 10am today.    Sore Throat   This is a new problem. The current episode started in the past 7 days. The problem has been unchanged. Neither side of throat is experiencing more pain than the other. There has been no fever. The pain is at a severity of 5/10. The pain is moderate. Associated symptoms include congestion and headaches. Pertinent negatives include no ear pain.       Constitution: Negative for chills and fever.   HENT:  Positive for congestion and sore throat. Negative for ear pain.    Cardiovascular: Negative.    Eyes: Negative.    Respiratory: Negative.     Gastrointestinal: Negative.    Genitourinary: Negative.    Musculoskeletal:  Negative for muscle ache.   Skin:  Negative for rash.   Neurological:  Positive for headaches.      Objective:     Physical Exam   Constitutional: She is oriented to person, place, and time. No distress.   HENT:   Head: Normocephalic and atraumatic.   Ears:   Right Ear: Tympanic membrane, external ear and ear canal normal. Tympanic membrane is not perforated.   Left Ear: Tympanic membrane, external ear and ear canal normal. Tympanic membrane is not perforated.   Nose: Rhinorrhea and congestion present. No epistaxis. Right sinus exhibits no maxillary sinus tenderness and no frontal sinus tenderness. Left sinus exhibits no maxillary sinus tenderness and no frontal " sinus tenderness.   Mouth/Throat: Mucous membranes are moist. No oropharyngeal exudate or posterior oropharyngeal erythema. Oropharynx is clear.   Eyes: Conjunctivae are normal. Pupils are equal, round, and reactive to light.   Neck: Neck supple.   Cardiovascular: Normal rate, regular rhythm, normal heart sounds and normal pulses.   Pulmonary/Chest: Effort normal and breath sounds normal.   Abdominal: Normal appearance.   Musculoskeletal: Normal range of motion.         General: Normal range of motion.   Lymphadenopathy:        Head (left side): Preauricular adenopathy present.     She has no cervical adenopathy.   Neurological: no focal deficit. She is alert and oriented to person, place, and time.   Skin: Skin is warm.         Comments: Normal turgor   Psychiatric: Her behavior is normal. Mood, judgment and thought content normal.   Nursing note and vitals reviewed.      Assessment:     1. Upper respiratory tract infection, unspecified type    2. Sore throat        Plan:       Upper respiratory tract infection, unspecified type    Sore throat  -     POCT Influenza A/B MOLECULAR  -     SARS Coronavirus 2 Antigen, POCT Manual Read  -     POCT Strep A, Molecular  -     POCT RSV by Molecular    Other orders  -     brompheniramine-pseudoeph-DM (BROMFED DM) 2-30-10 mg/5 mL Syrp; Take 10 mLs by mouth every 6 (six) hours as needed (cough/congestion).  Dispense: 160 mL; Refill: 0  -     fluticasone propionate (FLONASE) 50 mcg/actuation nasal spray; 2 sprays (100 mcg total) by Each Nostril route once daily.  Dispense: 18.2 mL; Refill: 0      Results for orders placed or performed in visit on 01/28/25   POCT Influenza A/B MOLECULAR    Collection Time: 01/28/25  4:21 PM   Result Value Ref Range    POC Molecular Influenza A Ag Negative Negative    POC Molecular Influenza B Ag Negative Negative     Acceptable Yes    POCT Strep A, Molecular    Collection Time: 01/28/25  4:21 PM   Result Value Ref Range     Molecular Strep A, POC Negative Negative     Acceptable Yes    POCT RSV by Molecular    Collection Time: 01/28/25  4:24 PM   Result Value Ref Range    POC RSV Rapid Ant Molecular Negative Negative     Acceptable Yes    SARS Coronavirus 2 Antigen, POCT Manual Read    Collection Time: 01/28/25  4:28 PM   Result Value Ref Range    SARS Coronavirus 2 Antigen Negative Negative     Acceptable Yes        Review of patient's allergies indicates:  No Known Allergies      SUMMARY:  See hpi. Testing currently negative. 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 Supportive measures. Handout on viral URI given as well. Immediate medical provider evaluation if worsens or new symptoms. Secondary infections can occur. See below.       Patient Instructions   Thank you for allowing our team to take care of you today.  Your diagnosis is acute viral syndrome.  Testing done today was negative--covid, flu, rsv, strep.  Assume you are contagious so be careful around those around you.   Supportive care.  Symptom management as appropriate like the recommendations below.   If any symptoms continue or worsen, get an immediate medical provider/ER evaluation.  Followup here as needed.     SYMPTOM MEDICATIONS IF NEEDED AND APPROPRIATE:    You may gargle with warm salt water 4 times a day and as needed.     Drink plenty of fluids.    Make sure you are getting rest.    You can use cough drops or lozenges to soothe your sore throat.     You can vitamin C, D, zinc (example Emergen-C) to help boost your immune system.    Cough/congestion medication as needed and appropriate--Bromfed DM.     Nasal saline spray to keep nasal passages moist. Flonase nasal spray 2 sprays each nostril daily.     Humidifier can be used to keep moisture in the air.     Acetaminophen (Tylenol) can be alternated with Ibuprofen (Motrin, Advil) ever four hours if no allergy or restriction for fever/aches/pains.

## 2025-01-28 NOTE — PATIENT INSTRUCTIONS
Thank you for allowing our team to take care of you today.  Your diagnosis is acute viral syndrome.  Testing done today was negative--covid, flu, rsv, strep.  Assume you are contagious so be careful around those around you.   Supportive care.  Symptom management as appropriate like the recommendations below.   If any symptoms continue or worsen, get an immediate medical provider/ER evaluation.  Followup here as needed.     SYMPTOM MEDICATIONS IF NEEDED AND APPROPRIATE:    You may gargle with warm salt water 4 times a day and as needed.     Drink plenty of fluids.    Make sure you are getting rest.    You can use cough drops or lozenges to soothe your sore throat.     You can vitamin C, D, zinc (example Emergen-C) to help boost your immune system.    Cough/congestion medication as needed and appropriate--Bromfed DM.     Nasal saline spray to keep nasal passages moist. Flonase nasal spray 2 sprays each nostril daily.     Humidifier can be used to keep moisture in the air.     Acetaminophen (Tylenol) can be alternated with Ibuprofen (Motrin, Advil) ever four hours if no allergy or restriction for fever/aches/pains.

## 2025-01-29 ENCOUNTER — TELEPHONE (OUTPATIENT)
Dept: URGENT CARE | Facility: CLINIC | Age: 30
End: 2025-01-29
Payer: COMMERCIAL

## 2025-01-29 NOTE — TELEPHONE ENCOUNTER
Spoke with pt about her visit yesterday for bacterial infection in her sinus. Pt ws given bromfed and flonase for sxs. I spoke with patient to see how her visit went yesterday and pt states she had a smooth and good visit, and that she has started her medicine.

## 2025-02-06 ENCOUNTER — OFFICE VISIT (OUTPATIENT)
Dept: PRIMARY CARE CLINIC | Facility: CLINIC | Age: 30
End: 2025-02-06
Payer: COMMERCIAL

## 2025-02-06 ENCOUNTER — PATIENT MESSAGE (OUTPATIENT)
Dept: PRIMARY CARE CLINIC | Facility: CLINIC | Age: 30
End: 2025-02-06

## 2025-02-06 DIAGNOSIS — J01.00 ACUTE NON-RECURRENT MAXILLARY SINUSITIS: Primary | ICD-10-CM

## 2025-02-06 PROCEDURE — 1159F MED LIST DOCD IN RCRD: CPT | Mod: CPTII,95,, | Performed by: NURSE PRACTITIONER

## 2025-02-06 PROCEDURE — 1160F RVW MEDS BY RX/DR IN RCRD: CPT | Mod: CPTII,95,, | Performed by: NURSE PRACTITIONER

## 2025-02-06 PROCEDURE — 98005 SYNCH AUDIO-VIDEO EST LOW 20: CPT | Mod: 95,,, | Performed by: NURSE PRACTITIONER

## 2025-02-06 RX ORDER — METHYLPREDNISOLONE 4 MG/1
4 TABLET ORAL DAILY
Qty: 10 TABLET | Refills: 0 | Status: SHIPPED | OUTPATIENT
Start: 2025-02-06 | End: 2025-02-16

## 2025-02-06 RX ORDER — AZITHROMYCIN 250 MG/1
TABLET, FILM COATED ORAL
Qty: 6 TABLET | Refills: 0 | Status: SHIPPED | OUTPATIENT
Start: 2025-02-06 | End: 2025-02-11

## 2025-02-06 RX ORDER — AZELASTINE 1 MG/ML
1 SPRAY, METERED NASAL 2 TIMES DAILY
Qty: 90 ML | Refills: 0 | Status: SHIPPED | OUTPATIENT
Start: 2025-02-06 | End: 2026-02-06

## 2025-02-06 NOTE — PROGRESS NOTES
Patient ID: Weston Zuleta is a 29 y.o. female.    Chief Complaint: Sinus Problem (Sinus infection x's 1 week )    History of Present Illness    Ms. Zuleta presents today for persistent upper respiratory symptoms following recent urgent care visit    She reports persistent daily symptoms including significant sinus pressure, particularly in the mornings, with green mucus discharge and ongoing cough. Recent urgent care evaluation was negative for influenza, COVID-19, streptococcus, and RSV.    She is not currently using contraceptive therapy and denies history of yeast infections with antibiotic use.      ROS:  General: -fever, -chills, -fatigue, -weight gain, -weight loss  Eyes: -vision changes, -redness, -discharge  ENT: -ear pain, +nasal congestion, -sore throat  Cardiovascular: -chest pain, -palpitations, -lower extremity edema  Respiratory: +cough, -shortness of breath  Gastrointestinal: -abdominal pain, -nausea, -vomiting, -diarrhea, -constipation, -blood in stool  Genitourinary: -dysuria, -hematuria, -frequency  Musculoskeletal: -joint pain, -muscle pain  Skin: -rash, -lesion  Neurological: -headache, -dizziness, -numbness, -tingling  Psychiatric: -anxiety, -depression, -sleep difficulty         Wt Readings from Last 10 Encounters:   01/28/25 90 kg (198 lb 6.6 oz)   11/04/24 97.5 kg (214 lb 15.2 oz)   10/30/24 90.7 kg (200 lb)   05/21/24 96.2 kg (212 lb)   11/01/23 101 kg (222 lb 10.6 oz)   10/18/23 99.7 kg (219 lb 11.2 oz)   05/11/23 93.9 kg (207 lb)   12/27/22 92.3 kg (203 lb 6 oz)   11/08/22 83.9 kg (185 lb)   09/12/22 83.9 kg (185 lb)       The ASCVD Risk score (Reid LAZO, et al., 2019) failed to calculate for the following reasons:    The 2019 ASCVD risk score is only valid for ages 40 to 79    PAST MEDICAL HISTORY:  Past Medical History:   Diagnosis Date    Anxiety state        PAST SURGICAL HISTORY:  Past Surgical History:   Procedure Laterality Date    APPENDECTOMY      NOSE SURGERY  2008 and  2015    x2    WISDOM TOOTH EXTRACTION         SOCIAL HISTORY:  Social History     Socioeconomic History    Marital status:    Occupational History    Occupation: student     Comment: [employer]   Tobacco Use    Smoking status: Never     Passive exposure: Never    Smokeless tobacco: Never   Substance and Sexual Activity    Alcohol use: Yes     Comment: Casual drinking    Drug use: No    Sexual activity: Yes     Partners: Male     Birth control/protection: Condom, OCP   Social History Narrative    Studying kinesology     Social Drivers of Health     Financial Resource Strain: Low Risk  (11/4/2024)    Overall Financial Resource Strain (CARDIA)     Difficulty of Paying Living Expenses: Not hard at all   Food Insecurity: No Food Insecurity (1/30/2025)    Received from [facility]    Hunger Vital Sign     Worried About Running Out of Food in the Last Year: Never true     Ran Out of Food in the Last Year: Never true   Transportation Needs: No Transportation Needs (1/30/2025)    Received from [facility]    PRAPARE - Transportation     Lack of Transportation (Medical): No     Lack of Transportation (Non-Medical): No   Physical Activity: Sufficiently Active (11/4/2024)    Exercise Vital Sign     Days of Exercise per Week: 4 days     Minutes of Exercise per Session: 40 min   Stress: No Stress Concern Present (11/4/2024)    [place] [facility] [employer] [facility] - Occupational Stress Questionnaire     Feeling of Stress : Only a little   Housing Stability: Low Risk  (1/30/2025)    Received from [facility]    Housing Stability Vital Sign     Unable to Pay for Housing in the Last Year: No     Number of Times Moved in the Last Year: 0     Homeless in the Last Year: No       FAMILY HISTORY:  Family History   Problem Relation Name Age of Onset    Multiple sclerosis Mother Glo     Early death Mother Glo     Diabetes Father Shimon     Hypertension Father Shimon     Cancer Maternal Grandmother Rachel Kvng          Breast cancer    Cancer Maternal Aunt Jagruti Staton         Breast cancer       ALLERGIES AND MEDICATIONS: updated and reviewed.  Review of patient's allergies indicates:  No Known Allergies  Current Outpatient Medications   Medication Sig Dispense Refill    brompheniramine-pseudoeph-DM (BROMFED DM) 2-30-10 mg/5 mL Syrp Take 10 mLs by mouth every 6 (six) hours as needed (cough/congestion). 160 mL 0    busPIRone (BUSPAR) 10 MG tablet Take 1 tablet by mouth twice daily 60 tablet 3    citalopram (CELEXA) 20 MG tablet Take 1 tablet by mouth once daily 90 tablet 3    fluticasone propionate (FLONASE) 50 mcg/actuation nasal spray 2 sprays (100 mcg total) by Each Nostril route once daily. 18.2 mL 0    metFORMIN (GLUCOPHAGE-XR) 500 MG ER 24hr tablet Take 500 mg by mouth.      norgestrel-ethinyl estradioL (LO/OVRAL) 0.3-30 mg-mcg per tablet Take 1 tablet by mouth once daily. 90 tablet 3    semaglutide (OZEMPIC) 0.25 mg or 0.5 mg(2 mg/1.5 mL) pen injector Inject 0.5 mg into the skin every 7 days.      azelastine (ASTELIN) 137 mcg (0.1 %) nasal spray 1 spray (137 mcg total) by Nasal route 2 (two) times daily. 90 mL 0    azithromycin (Z-CT) 250 MG tablet Take 2 tablets by mouth on day 1; Take 1 tablet by mouth on days 2-5 6 tablet 0    methylPREDNISolone (MEDROL) 4 MG Tab Take 1 tablet (4 mg total) by mouth once daily. for 10 days 10 tablet 0     No current facility-administered medications for this visit.         Physical Exam  Constitutional:       Appearance: Normal appearance. She is obese.   HENT:      Head: Normocephalic and atraumatic.      Nose: Nasal tenderness and congestion present.      Right Sinus: Maxillary sinus tenderness present.      Left Sinus: Maxillary sinus tenderness present.      Mouth/Throat:      Pharynx: Posterior oropharyngeal erythema present.   Eyes:      Pupils: Pupils are equal, round, and reactive to light.   Cardiovascular:      Rate and Rhythm: Normal rate.      Pulses: Normal pulses.    Pulmonary:      Effort: Pulmonary effort is normal.   Abdominal:      General: Bowel sounds are normal.   Musculoskeletal:         General: Normal range of motion.      Cervical back: Normal range of motion.   Skin:     General: Skin is warm and dry.      Capillary Refill: Capillary refill takes less than 2 seconds.   Neurological:      Mental Status: She is alert and oriented to person, place, and time.   Psychiatric:         Mood and Affect: Mood normal.          Assessment:   LABS:   Lab Results   Component Value Date    HGBA1C 5.1 11/03/2023      Lab Results   Component Value Date    CHOL 239 (H) 11/03/2023    CHOL 215 (H) 12/22/2022    CHOL 197 04/22/2020     Lab Results   Component Value Date    LDLCALC 146.0 11/03/2023    LDLCALC 121.2 12/22/2022    LDLCALC 107.2 04/22/2020     Lab Results   Component Value Date    WBC 4.23 11/03/2023    HGB 13.4 11/03/2023    HCT 40.6 11/03/2023     11/03/2023    CHOL 239 (H) 11/03/2023    TRIG 95 11/03/2023    HDL 74 11/03/2023    ALT 19 11/03/2023    AST 22 11/03/2023     11/03/2023    K 4.4 11/03/2023     11/03/2023    CREATININE 0.9 11/03/2023    BUN 19 11/03/2023    CO2 19 (L) 11/03/2023    TSH 1.044 11/03/2023    HGBA1C 5.1 11/03/2023       Plan:   Weston was seen today for sinus problem.    Diagnoses and all orders for this visit:    Acute non-recurrent maxillary sinusitis  -     azithromycin (Z-CT) 250 MG tablet; Take 2 tablets by mouth on day 1; Take 1 tablet by mouth on days 2-5  -     methylPREDNISolone (MEDROL) 4 MG Tab; Take 1 tablet (4 mg total) by mouth once daily. for 10 days  -     azelastine (ASTELIN) 137 mcg (0.1 %) nasal spray; 1 spray (137 mcg total) by Nasal route 2 (two) times daily.        Assessment & Plan    IMPRESSION:  -Assessed persistent symptoms following recent urgent care visit for upper respiratory infection  -Determined likely maxillary sinus infection based on reported symptoms  -Decided on combination therapy with  antibiotic (Z-Morris) and steroid (MedDrop Dosepak) to address infection and inflammation  -Recommend continuation of Flonase for upper airway and nasal passage inflammation  -Added azelastine nasal spray as antihistamine to complement Flonase    MAXILLARY SINUS INFECTION:  - Evaluated the patient's persistent symptoms including congestion, sore throat, headaches, cough, sinus pressure, and green mucus, despite previous treatment with bromfet, Sudafed, and Flonase.  - Acknowledged the patient's recent urgent care visit for an upper respiratory infection where influenza, COVID-19, strep, and RSV tests were negative.  - Diagnosed likely maxillary sinus infection based on symptom evaluation.  - Prescribed Z-Morris (azithromycin) for antibiotic treatment.  - Prescribed MedDrop Dosepak (steroid) for anti-inflammatory effect.  - Instructed to continue Flonase nasal spray: 1 spray in each nostril every morning.  - Prescribed azelastine nasal spray: 1 spray in each nostril after Flonase in the morning, and 1 spray in each nostril before bed for additional anti-inflammatory and antihistamine effects.    MEDICATION EDUCATION:  - Educated the patient about potential antibiotic-induced candidiasis, allergic reactions, or adverse effects from prescribed medications.  - Instructed on proper use of nasal sprays, including potential for unpleasant taste and need to rinse mouth after use to prevent oral thrush.  - Advised the patient to contact the office or go to the emergency room for any allergies, side effects, or adverse reactions to prescribed medications, especially if experiencing swelling of the upper airways.           FOLLOW-UP:RTC as needed.     Each patient to whom he or she provides medical services by telemedicine is:  (1) informed of the relationship between the health care provider and patient and the respective role of any other health care provider with respect to management of the patient; and (2) notified that he or  she may decline to receive medical services by telemedicine and may withdraw from such care at any time.    I spent a total of 20+ minutes face to face and non-face to face on the date of this visit.This includes time preparing to see the patient (eg, review of tests, notes), obtaining and/or reviewing additional history from an independent historian and/or outside medical records, documenting clinical information in the electronic health record, independently interpreting results and/or communicating results to the patient/family/caregiver, or care coordinator.  Visit today included increased complexity associated with the care of the episodic problem addressed and managing the longitudinal care of the patient due to the serious and/or complex managed problem(s).      This note was generated with the assistance of ambient listening technology. Verbal consent was obtained by the patient and accompanying visitor(s) for the recording of patient appointment to facilitate this note. I attest to having reviewed and edited the generated note for accuracy, though some syntax or spelling errors may persist. Please contact the author of this note for any clarification.      Sincerely,  Christiano Saucedo NP     Answers submitted by the patient for this visit:  Cough Questionnaire (Submitted on 2/6/2025)  Chief Complaint: Cough  Chronicity: recurrent  Onset: 1 to 4 weeks ago  Progression since onset: waxing and waning  Frequency: every few minutes  Cough characteristics: non-productive, productive of sputum  chest pain: No  chills: No  ear congestion: Yes  ear pain: Yes  fever: No  headaches: Yes  heartburn: No  hemoptysis: No  myalgias: No  nasal congestion: Yes  postnasal drip: Yes  rash: No  rhinorrhea: Yes  shortness of breath: No  sore throat: Yes  sweats: No  weight loss: No  wheezing: No  Aggravated by: lying down  asthma: No  bronchiectasis: No  bronchitis: No  COPD: No  emphysema: No  environmental allergies:  No  pneumonia: No  Treatments tried: OTC cough suppressant, prescription cough suppressant, rest  Improvement on treatment: mild

## 2025-02-11 ENCOUNTER — PATIENT MESSAGE (OUTPATIENT)
Dept: PRIMARY CARE CLINIC | Facility: CLINIC | Age: 30
End: 2025-02-11
Payer: COMMERCIAL

## 2025-03-15 NOTE — TELEPHONE ENCOUNTER
No care due was identified.  [facility] Embedded Care Due Messages. Reference number: [id number].   3/15/2025 7:02:54 AM CDT

## 2025-03-17 NOTE — TELEPHONE ENCOUNTER
Refill Routing Note   Medication(s) are not appropriate for processing by [facility] for the following reason(s):        Outside of protocol    [facility] action(s):  Route             Appointments  past 12m or future 3m with PCP    Date Provider   Last Visit   11/4/2024 Madhuri Otto MD   Next Visit   Visit date not found Madhuri Otto MD   ED visits in past 90 days: 0        Note composed:7:53 AM 03/17/2025

## 2025-03-18 RX ORDER — BUSPIRONE HYDROCHLORIDE 10 MG/1
10 TABLET ORAL 2 TIMES DAILY
Qty: 60 TABLET | Refills: 3 | Status: SHIPPED | OUTPATIENT
Start: 2025-03-18

## 2025-04-29 RX ORDER — BUSPIRONE HYDROCHLORIDE 10 MG/1
10 TABLET ORAL 2 TIMES DAILY
Qty: 60 TABLET | Refills: 3 | Status: SHIPPED | OUTPATIENT
Start: 2025-04-29

## 2025-04-29 NOTE — TELEPHONE ENCOUNTER
No care due was identified.  [facility] Embedded Care Due Messages. Reference number: [id number].   4/29/2025 9:43:55 AM CDT

## 2025-07-20 NOTE — TELEPHONE ENCOUNTER
No care due was identified.  [facility] Embedded Care Due Messages. Reference number: [id number].   7/20/2025 6:44:59 AM CDT

## 2025-07-21 RX ORDER — CITALOPRAM 20 MG/1
20 TABLET ORAL DAILY
Qty: 90 TABLET | Refills: 1 | Status: SHIPPED | OUTPATIENT
Start: 2025-07-21